# Patient Record
Sex: MALE | Race: WHITE | URBAN - METROPOLITAN AREA
[De-identification: names, ages, dates, MRNs, and addresses within clinical notes are randomized per-mention and may not be internally consistent; named-entity substitution may affect disease eponyms.]

---

## 2018-02-13 ENCOUNTER — EMERGENCY (EMERGENCY)
Facility: HOSPITAL | Age: 63
LOS: 0 days | Discharge: HOME | End: 2018-02-13
Attending: EMERGENCY MEDICINE | Admitting: EMERGENCY MEDICINE
Payer: SELF-PAY

## 2018-02-13 VITALS
OXYGEN SATURATION: 100 % | HEIGHT: 68 IN | RESPIRATION RATE: 18 BRPM | SYSTOLIC BLOOD PRESSURE: 180 MMHG | WEIGHT: 171.08 LBS | HEART RATE: 77 BPM | DIASTOLIC BLOOD PRESSURE: 74 MMHG | TEMPERATURE: 98 F

## 2018-02-13 DIAGNOSIS — E11.9 TYPE 2 DIABETES MELLITUS WITHOUT COMPLICATIONS: ICD-10-CM

## 2018-02-13 DIAGNOSIS — R51 HEADACHE: ICD-10-CM

## 2018-02-13 PROCEDURE — 99285 EMERGENCY DEPT VISIT HI MDM: CPT

## 2018-02-13 PROCEDURE — 99053 MED SERV 10PM-8AM 24 HR FAC: CPT

## 2018-02-13 PROCEDURE — 70450 CT HEAD/BRAIN W/O DYE: CPT | Mod: 26

## 2018-02-13 RX ORDER — ACETAMINOPHEN 500 MG
650 TABLET ORAL ONCE
Qty: 0 | Refills: 0 | Status: COMPLETED | OUTPATIENT
Start: 2018-02-13 | End: 2018-02-13

## 2018-02-13 RX ADMIN — Medication 650 MILLIGRAM(S): at 22:10
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
## 2020-03-25 NOTE — ED ADULT NURSE NOTE - NS ED NURSE RECORD ANOTHER HT AND WT
SUBJECTIVE:  Grant Shoemaker is a 60 year old male with a chief complaint of sore throat.  Onset of symptoms was 1 day(s) ago.    Course of illness: sudden onset.  Severity moderate  Current and Associated symptoms: fever, chills and sore throat  Treatment measures tried include None tried.  Predisposing factors include exposure to strep and exposure to influenza.  Grocery     Past Medical History:   Diagnosis Date     Adenomatous polyp of colon      Hyperlipidemia      Hypertension      Hypothyroidism      ANN (obstructive sleep apnea)      Current Outpatient Medications   Medication Sig Dispense Refill     ASPIRIN ADULT LOW STRENGTH 81 MG EC tablet Take 1 tablet by mouth daily       atenolol (TENORMIN) 50 MG tablet Take 50 mg by mouth daily       atorvastatin (LIPITOR) 10 MG tablet Take 10 mg by mouth daily       calcium carbonate 750 MG CHEW Take 1,500 mg by mouth daily       Cholecalciferol (VITAMIN D3) 50 MCG (2000 UT) CAPS Take 2 tablets by mouth daily       levothyroxine (SYNTHROID/LEVOTHROID) 150 MCG tablet Take 1 tablet by mouth daily       lisinopril (ZESTRIL) 10 MG tablet Take 10 mg by mouth daily       triamterene-HCTZ (MAXZIDE-25) 37.5-25 MG tablet Take 1 tablet by mouth daily       Social History     Tobacco Use     Smoking status: Never Smoker     Smokeless tobacco: Never Used   Substance Use Topics     Alcohol use: Yes     Frequency: Monthly or less       ROS:  10 point ROS negative except as listed above      OBJECTIVE:   /74   Pulse 78   Temp 97.8  F (36.6  C) (Tympanic)   Resp 20   Wt 100.7 kg (222 lb)   SpO2 98%   BMI 34.77 kg/m    GENERAL APPEARANCE: healthy, alert and no distress  EYES: EOMI,  PERRL, conjunctiva clear  HENT: ear canals and TM's normal.  Nose normal.  Pharynx erythematous with some exudate noted.  NECK: supple, non-tender to palpation, no adenopathy noted  RESP: lungs clear to auscultation - no rales, rhonchi or wheezes  CV: regular rates and rhythm,  normal S1 S2, no murmur noted  ABDOMEN:  soft, nontender, no HSM or masses and bowel sounds normal  SKIN: no suspicious lesions or rashes    Results for orders placed or performed in visit on 03/25/20   Streptococcus A Rapid Scr w Reflx to PCR     Status: None    Specimen: Throat   Result Value Ref Range    Strep Specimen Description Throat     Streptococcus Group A Rapid Screen Negative NEG^Negative       ASSESSMENT:  (R68.89) Suspected Covid-19 Virus Infection  (primary encounter diagnosis)  (J02.9) Sore throat  Plan: Streptococcus A Rapid Scr w Reflx to PCR, Group        A Streptococcus PCR Throat Swab          Fluids, rest    7 day quarantine.  No return without 72 hours symptoms free     Yes

## 2022-09-30 ENCOUNTER — INPATIENT (INPATIENT)
Facility: HOSPITAL | Age: 67
LOS: 2 days | Discharge: HOME | DRG: 419 | End: 2022-10-03
Attending: SURGERY | Admitting: SURGERY
Payer: MEDICARE

## 2022-09-30 VITALS
WEIGHT: 149.91 LBS | SYSTOLIC BLOOD PRESSURE: 194 MMHG | HEART RATE: 63 BPM | TEMPERATURE: 98 F | RESPIRATION RATE: 19 BRPM | OXYGEN SATURATION: 98 % | HEIGHT: 68 IN | DIASTOLIC BLOOD PRESSURE: 76 MMHG

## 2022-09-30 DIAGNOSIS — Z95.1 PRESENCE OF AORTOCORONARY BYPASS GRAFT: Chronic | ICD-10-CM

## 2022-09-30 DIAGNOSIS — R10.9 UNSPECIFIED ABDOMINAL PAIN: ICD-10-CM

## 2022-09-30 PROBLEM — E11.9 TYPE 2 DIABETES MELLITUS WITHOUT COMPLICATIONS: Chronic | Status: ACTIVE | Noted: 2018-02-14

## 2022-09-30 LAB
ALBUMIN SERPL ELPH-MCNC: 4.4 G/DL — SIGNIFICANT CHANGE UP (ref 3.3–5)
ALP SERPL-CCNC: 70 U/L — SIGNIFICANT CHANGE UP (ref 40–120)
ALT FLD-CCNC: 25 U/L — SIGNIFICANT CHANGE UP (ref 12–78)
ANION GAP SERPL CALC-SCNC: 6 MMOL/L — SIGNIFICANT CHANGE UP (ref 5–17)
AST SERPL-CCNC: 24 U/L — SIGNIFICANT CHANGE UP (ref 15–37)
BASOPHILS # BLD AUTO: 0 K/UL — SIGNIFICANT CHANGE UP (ref 0–0.2)
BASOPHILS NFR BLD AUTO: 0 % — SIGNIFICANT CHANGE UP (ref 0–2)
BILIRUB SERPL-MCNC: 1.7 MG/DL — HIGH (ref 0.2–1.2)
BUN SERPL-MCNC: 19 MG/DL — SIGNIFICANT CHANGE UP (ref 7–23)
CALCIUM SERPL-MCNC: 9.1 MG/DL — SIGNIFICANT CHANGE UP (ref 8.5–10.1)
CHLORIDE SERPL-SCNC: 103 MMOL/L — SIGNIFICANT CHANGE UP (ref 96–108)
CO2 SERPL-SCNC: 27 MMOL/L — SIGNIFICANT CHANGE UP (ref 22–31)
CREAT SERPL-MCNC: 0.92 MG/DL — SIGNIFICANT CHANGE UP (ref 0.5–1.3)
EGFR: 91 ML/MIN/1.73M2 — SIGNIFICANT CHANGE UP
EOSINOPHIL # BLD AUTO: 0 K/UL — SIGNIFICANT CHANGE UP (ref 0–0.5)
EOSINOPHIL NFR BLD AUTO: 0 % — SIGNIFICANT CHANGE UP (ref 0–6)
FLUAV AG NPH QL: SIGNIFICANT CHANGE UP
FLUBV AG NPH QL: SIGNIFICANT CHANGE UP
GLUCOSE SERPL-MCNC: 154 MG/DL — HIGH (ref 70–99)
HCT VFR BLD CALC: 39.4 % — SIGNIFICANT CHANGE UP (ref 39–50)
HGB BLD-MCNC: 12.8 G/DL — LOW (ref 13–17)
LACTATE SERPL-SCNC: 1.5 MMOL/L — SIGNIFICANT CHANGE UP (ref 0.7–2)
LIDOCAIN IGE QN: 118 U/L — SIGNIFICANT CHANGE UP (ref 73–393)
LYMPHOCYTES # BLD AUTO: 0.39 K/UL — LOW (ref 1–3.3)
LYMPHOCYTES # BLD AUTO: 3 % — LOW (ref 13–44)
MAGNESIUM SERPL-MCNC: 1.5 MG/DL — LOW (ref 1.6–2.6)
MCHC RBC-ENTMCNC: 28.9 PG — SIGNIFICANT CHANGE UP (ref 27–34)
MCHC RBC-ENTMCNC: 32.5 GM/DL — SIGNIFICANT CHANGE UP (ref 32–36)
MCV RBC AUTO: 88.9 FL — SIGNIFICANT CHANGE UP (ref 80–100)
MONOCYTES # BLD AUTO: 0.65 K/UL — SIGNIFICANT CHANGE UP (ref 0–0.9)
MONOCYTES NFR BLD AUTO: 5 % — SIGNIFICANT CHANGE UP (ref 2–14)
NEUTROPHILS # BLD AUTO: 12.03 K/UL — HIGH (ref 1.8–7.4)
NEUTROPHILS NFR BLD AUTO: 92 % — HIGH (ref 43–77)
NRBC # BLD: SIGNIFICANT CHANGE UP /100 WBCS (ref 0–0)
PLATELET # BLD AUTO: 238 K/UL — SIGNIFICANT CHANGE UP (ref 150–400)
POTASSIUM SERPL-MCNC: 4.4 MMOL/L — SIGNIFICANT CHANGE UP (ref 3.5–5.3)
POTASSIUM SERPL-SCNC: 4.4 MMOL/L — SIGNIFICANT CHANGE UP (ref 3.5–5.3)
PROT SERPL-MCNC: 7.8 GM/DL — SIGNIFICANT CHANGE UP (ref 6–8.3)
RBC # BLD: 4.43 M/UL — SIGNIFICANT CHANGE UP (ref 4.2–5.8)
RBC # FLD: 13.1 % — SIGNIFICANT CHANGE UP (ref 10.3–14.5)
RSV RNA NPH QL NAA+NON-PROBE: SIGNIFICANT CHANGE UP
SARS-COV-2 RNA SPEC QL NAA+PROBE: SIGNIFICANT CHANGE UP
SODIUM SERPL-SCNC: 136 MMOL/L — SIGNIFICANT CHANGE UP (ref 135–145)
TROPONIN I, HIGH SENSITIVITY RESULT: 8.36 NG/L — SIGNIFICANT CHANGE UP
WBC # BLD: 13.08 K/UL — HIGH (ref 3.8–10.5)
WBC # FLD AUTO: 13.08 K/UL — HIGH (ref 3.8–10.5)

## 2022-09-30 PROCEDURE — 99223 1ST HOSP IP/OBS HIGH 75: CPT

## 2022-09-30 PROCEDURE — 99285 EMERGENCY DEPT VISIT HI MDM: CPT | Mod: FS

## 2022-09-30 PROCEDURE — 83036 HEMOGLOBIN GLYCOSYLATED A1C: CPT

## 2022-09-30 PROCEDURE — 86900 BLOOD TYPING SEROLOGIC ABO: CPT

## 2022-09-30 PROCEDURE — 74183 MRI ABD W/O CNTR FLWD CNTR: CPT

## 2022-09-30 PROCEDURE — 76705 ECHO EXAM OF ABDOMEN: CPT | Mod: 26

## 2022-09-30 PROCEDURE — A9579: CPT

## 2022-09-30 PROCEDURE — 80053 COMPREHEN METABOLIC PANEL: CPT

## 2022-09-30 PROCEDURE — 83735 ASSAY OF MAGNESIUM: CPT

## 2022-09-30 PROCEDURE — 86901 BLOOD TYPING SEROLOGIC RH(D): CPT

## 2022-09-30 PROCEDURE — 47562 LAPAROSCOPIC CHOLECYSTECTOMY: CPT | Mod: AS

## 2022-09-30 PROCEDURE — 86803 HEPATITIS C AB TEST: CPT

## 2022-09-30 PROCEDURE — 88304 TISSUE EXAM BY PATHOLOGIST: CPT

## 2022-09-30 PROCEDURE — C9399: CPT

## 2022-09-30 PROCEDURE — 36415 COLL VENOUS BLD VENIPUNCTURE: CPT

## 2022-09-30 PROCEDURE — 86850 RBC ANTIBODY SCREEN: CPT

## 2022-09-30 PROCEDURE — 74177 CT ABD & PELVIS W/CONTRAST: CPT | Mod: 26,MA

## 2022-09-30 PROCEDURE — 82962 GLUCOSE BLOOD TEST: CPT

## 2022-09-30 PROCEDURE — 90662 IIV NO PRSV INCREASED AG IM: CPT

## 2022-09-30 PROCEDURE — 12345: CPT | Mod: NC

## 2022-09-30 PROCEDURE — 85025 COMPLETE CBC W/AUTO DIFF WBC: CPT

## 2022-09-30 PROCEDURE — 80076 HEPATIC FUNCTION PANEL: CPT

## 2022-09-30 PROCEDURE — 80048 BASIC METABOLIC PNL TOTAL CA: CPT

## 2022-09-30 RX ORDER — ATORVASTATIN CALCIUM 80 MG/1
1 TABLET, FILM COATED ORAL
Qty: 0 | Refills: 0 | DISCHARGE

## 2022-09-30 RX ORDER — HYDROMORPHONE HYDROCHLORIDE 2 MG/ML
0.5 INJECTION INTRAMUSCULAR; INTRAVENOUS; SUBCUTANEOUS ONCE
Refills: 0 | Status: DISCONTINUED | OUTPATIENT
Start: 2022-09-30 | End: 2022-09-30

## 2022-09-30 RX ORDER — ONDANSETRON 8 MG/1
4 TABLET, FILM COATED ORAL ONCE
Refills: 0 | Status: COMPLETED | OUTPATIENT
Start: 2022-09-30 | End: 2022-09-30

## 2022-09-30 RX ORDER — HYDROMORPHONE HYDROCHLORIDE 2 MG/ML
1 INJECTION INTRAMUSCULAR; INTRAVENOUS; SUBCUTANEOUS EVERY 4 HOURS
Refills: 0 | Status: DISCONTINUED | OUTPATIENT
Start: 2022-09-30 | End: 2022-10-03

## 2022-09-30 RX ORDER — MAGNESIUM SULFATE 500 MG/ML
2 VIAL (ML) INJECTION ONCE
Refills: 0 | Status: COMPLETED | OUTPATIENT
Start: 2022-09-30 | End: 2022-09-30

## 2022-09-30 RX ORDER — INSULIN LISPRO 100/ML
VIAL (ML) SUBCUTANEOUS EVERY 6 HOURS
Refills: 0 | Status: DISCONTINUED | OUTPATIENT
Start: 2022-09-30 | End: 2022-10-03

## 2022-09-30 RX ORDER — INSULIN LISPRO 100/ML
VIAL (ML) SUBCUTANEOUS
Refills: 0 | Status: DISCONTINUED | OUTPATIENT
Start: 2022-09-30 | End: 2022-09-30

## 2022-09-30 RX ORDER — MORPHINE SULFATE 50 MG/1
2 CAPSULE, EXTENDED RELEASE ORAL ONCE
Refills: 0 | Status: DISCONTINUED | OUTPATIENT
Start: 2022-09-30 | End: 2022-09-30

## 2022-09-30 RX ORDER — SODIUM CHLORIDE 9 MG/ML
1000 INJECTION INTRAMUSCULAR; INTRAVENOUS; SUBCUTANEOUS ONCE
Refills: 0 | Status: COMPLETED | OUTPATIENT
Start: 2022-09-30 | End: 2022-09-30

## 2022-09-30 RX ORDER — SODIUM CHLORIDE 9 MG/ML
1000 INJECTION, SOLUTION INTRAVENOUS
Refills: 0 | Status: DISCONTINUED | OUTPATIENT
Start: 2022-09-30 | End: 2022-10-03

## 2022-09-30 RX ORDER — DEXTROSE 50 % IN WATER 50 %
25 SYRINGE (ML) INTRAVENOUS ONCE
Refills: 0 | Status: DISCONTINUED | OUTPATIENT
Start: 2022-09-30 | End: 2022-10-03

## 2022-09-30 RX ORDER — HEPARIN SODIUM 5000 [USP'U]/ML
5000 INJECTION INTRAVENOUS; SUBCUTANEOUS EVERY 12 HOURS
Refills: 0 | Status: DISCONTINUED | OUTPATIENT
Start: 2022-09-30 | End: 2022-10-03

## 2022-09-30 RX ORDER — PIPERACILLIN AND TAZOBACTAM 4; .5 G/20ML; G/20ML
3.38 INJECTION, POWDER, LYOPHILIZED, FOR SOLUTION INTRAVENOUS EVERY 8 HOURS
Refills: 0 | Status: DISCONTINUED | OUTPATIENT
Start: 2022-10-01 | End: 2022-10-03

## 2022-09-30 RX ORDER — DEXTROSE 50 % IN WATER 50 %
12.5 SYRINGE (ML) INTRAVENOUS ONCE
Refills: 0 | Status: DISCONTINUED | OUTPATIENT
Start: 2022-09-30 | End: 2022-10-03

## 2022-09-30 RX ORDER — SODIUM CHLORIDE 9 MG/ML
1000 INJECTION INTRAMUSCULAR; INTRAVENOUS; SUBCUTANEOUS
Refills: 0 | Status: DISCONTINUED | OUTPATIENT
Start: 2022-09-30 | End: 2022-10-03

## 2022-09-30 RX ORDER — SITAGLIPTIN 50 MG/1
1 TABLET, FILM COATED ORAL
Qty: 0 | Refills: 0 | DISCHARGE

## 2022-09-30 RX ORDER — ENOXAPARIN SODIUM 100 MG/ML
40 INJECTION SUBCUTANEOUS EVERY 12 HOURS
Refills: 0 | Status: DISCONTINUED | OUTPATIENT
Start: 2022-09-30 | End: 2022-09-30

## 2022-09-30 RX ORDER — PIPERACILLIN AND TAZOBACTAM 4; .5 G/20ML; G/20ML
3.38 INJECTION, POWDER, LYOPHILIZED, FOR SOLUTION INTRAVENOUS ONCE
Refills: 0 | Status: COMPLETED | OUTPATIENT
Start: 2022-09-30 | End: 2022-10-01

## 2022-09-30 RX ORDER — ASPIRIN/CALCIUM CARB/MAGNESIUM 324 MG
1 TABLET ORAL
Qty: 0 | Refills: 0 | DISCHARGE

## 2022-09-30 RX ORDER — HYDROMORPHONE HYDROCHLORIDE 2 MG/ML
0.5 INJECTION INTRAMUSCULAR; INTRAVENOUS; SUBCUTANEOUS
Refills: 0 | Status: DISCONTINUED | OUTPATIENT
Start: 2022-09-30 | End: 2022-10-03

## 2022-09-30 RX ORDER — PIPERACILLIN AND TAZOBACTAM 4; .5 G/20ML; G/20ML
3.38 INJECTION, POWDER, LYOPHILIZED, FOR SOLUTION INTRAVENOUS ONCE
Refills: 0 | Status: COMPLETED | OUTPATIENT
Start: 2022-09-30 | End: 2022-09-30

## 2022-09-30 RX ORDER — LOSARTAN POTASSIUM 100 MG/1
1 TABLET, FILM COATED ORAL
Qty: 0 | Refills: 0 | DISCHARGE

## 2022-09-30 RX ORDER — INFLUENZA VIRUS VACCINE 15; 15; 15; 15 UG/.5ML; UG/.5ML; UG/.5ML; UG/.5ML
0.7 SUSPENSION INTRAMUSCULAR ONCE
Refills: 0 | Status: COMPLETED | OUTPATIENT
Start: 2022-09-30 | End: 2022-10-03

## 2022-09-30 RX ORDER — ONDANSETRON 8 MG/1
4 TABLET, FILM COATED ORAL ONCE
Refills: 0 | Status: DISCONTINUED | OUTPATIENT
Start: 2022-09-30 | End: 2022-10-03

## 2022-09-30 RX ORDER — METFORMIN HYDROCHLORIDE 850 MG/1
1 TABLET ORAL
Qty: 0 | Refills: 0 | DISCHARGE

## 2022-09-30 RX ORDER — METOCLOPRAMIDE HCL 10 MG
10 TABLET ORAL ONCE
Refills: 0 | Status: COMPLETED | OUTPATIENT
Start: 2022-09-30 | End: 2022-09-30

## 2022-09-30 RX ORDER — GLUCAGON INJECTION, SOLUTION 0.5 MG/.1ML
1 INJECTION, SOLUTION SUBCUTANEOUS ONCE
Refills: 0 | Status: DISCONTINUED | OUTPATIENT
Start: 2022-09-30 | End: 2022-10-03

## 2022-09-30 RX ORDER — LOSARTAN POTASSIUM 100 MG/1
50 TABLET, FILM COATED ORAL DAILY
Refills: 0 | Status: DISCONTINUED | OUTPATIENT
Start: 2022-09-30 | End: 2022-10-03

## 2022-09-30 RX ORDER — METFORMIN HYDROCHLORIDE 850 MG/1
2 TABLET ORAL
Qty: 0 | Refills: 0 | DISCHARGE

## 2022-09-30 RX ORDER — MAGNESIUM SULFATE 500 MG/ML
1 VIAL (ML) INJECTION ONCE
Refills: 0 | Status: COMPLETED | OUTPATIENT
Start: 2022-09-30 | End: 2022-09-30

## 2022-09-30 RX ORDER — DEXTROSE 50 % IN WATER 50 %
15 SYRINGE (ML) INTRAVENOUS ONCE
Refills: 0 | Status: DISCONTINUED | OUTPATIENT
Start: 2022-09-30 | End: 2022-10-03

## 2022-09-30 RX ADMIN — MORPHINE SULFATE 2 MILLIGRAM(S): 50 CAPSULE, EXTENDED RELEASE ORAL at 15:13

## 2022-09-30 RX ADMIN — SODIUM CHLORIDE 1000 MILLILITER(S): 9 INJECTION INTRAMUSCULAR; INTRAVENOUS; SUBCUTANEOUS at 13:31

## 2022-09-30 RX ADMIN — HEPARIN SODIUM 5000 UNIT(S): 5000 INJECTION INTRAVENOUS; SUBCUTANEOUS at 22:57

## 2022-09-30 RX ADMIN — HYDROMORPHONE HYDROCHLORIDE 0.5 MILLIGRAM(S): 2 INJECTION INTRAMUSCULAR; INTRAVENOUS; SUBCUTANEOUS at 17:04

## 2022-09-30 RX ADMIN — PIPERACILLIN AND TAZOBACTAM 200 GRAM(S): 4; .5 INJECTION, POWDER, LYOPHILIZED, FOR SOLUTION INTRAVENOUS at 17:04

## 2022-09-30 RX ADMIN — Medication 25 GRAM(S): at 23:00

## 2022-09-30 RX ADMIN — Medication 100 GRAM(S): at 15:10

## 2022-09-30 RX ADMIN — SODIUM CHLORIDE 125 MILLILITER(S): 9 INJECTION INTRAMUSCULAR; INTRAVENOUS; SUBCUTANEOUS at 22:58

## 2022-09-30 RX ADMIN — MORPHINE SULFATE 2 MILLIGRAM(S): 50 CAPSULE, EXTENDED RELEASE ORAL at 13:30

## 2022-09-30 RX ADMIN — SODIUM CHLORIDE 1000 MILLILITER(S): 9 INJECTION INTRAMUSCULAR; INTRAVENOUS; SUBCUTANEOUS at 17:04

## 2022-09-30 RX ADMIN — Medication 10 MILLIGRAM(S): at 17:04

## 2022-09-30 RX ADMIN — ONDANSETRON 4 MILLIGRAM(S): 8 TABLET, FILM COATED ORAL at 13:30

## 2022-09-30 NOTE — ED STATDOCS - OBJECTIVE STATEMENT
66 y/o male with PMHx of DM presents to the ED c/o abdominal pain, vomiting since this morning. No diarrhea. Unable to tolerate PO. No fevers, chills.

## 2022-09-30 NOTE — PHARMACOTHERAPY INTERVENTION NOTE - COMMENTS
Medication history complete, reviewed medications with patient provided and confirmed with doctor first med hx.

## 2022-09-30 NOTE — H&P ADULT - ASSESSMENT
A/P:  Admit to Surgery Dr Lubin  Dx: Acute Appendicitis  NPO  IVF\Replace MG+  Zosyn  Pain control  Insulin/accuchecks  Incentive Spirometry  Zofran PRN  F/U Med consult   F/U Cardio Consult   AM Labs  Plan for OR /Lap Kathleen Tomorrow  Above as discussed with Dr Lubin

## 2022-09-30 NOTE — ED ADULT TRIAGE NOTE - CHIEF COMPLAINT QUOTE
Pt presents to ER c/o abd pain and vomiting. Onset of symptoms began at 0600 this morning. Pt reports abd pain is in the center of his abdomen and he is unable to have a bm

## 2022-09-30 NOTE — PATIENT PROFILE ADULT - FALL HARM RISK - HARM RISK INTERVENTIONS

## 2022-09-30 NOTE — ED STATDOCS - CARE PLAN
1 Principal Discharge DX:	Abdominal pain  Secondary Diagnosis:	Cholelithiasis  Secondary Diagnosis:	Nausea & vomiting

## 2022-09-30 NOTE — PROGRESS NOTE ADULT - SUBJECTIVE AND OBJECTIVE BOX
· HPI Objective Statement: 66 y/o male with PMHx of DM presents to the ED c/o abdominal pain, vomiting since this morning. No diarrhea. Unable to tolerate PO. No fevers, chills. Gallstones on sono and CT with no other secondary signs of cholecystitis.     PE    VSS afeb    Abd- soft, tender RUQ, no rebound, no HSM

## 2022-09-30 NOTE — ED STATDOCS - NSICDXPASTMEDICALHX_GEN_ALL_CORE_FT
Attempted to contact patient but there was no response, left a voicemail advising patient to please return my phone call here at the clinic  Will make patient aware when he gives me a call back   PAST MEDICAL HISTORY:  DM (diabetes mellitus)

## 2022-09-30 NOTE — H&P ADULT - NSHPPHYSICALEXAM_GEN_ALL_CORE
Vital Signs Last 24 Hrs  T(C): 36.8 (30 Sep 2022 12:30), Max: 36.8 (30 Sep 2022 12:30)  T(F): 98.2 (30 Sep 2022 12:30), Max: 98.2 (30 Sep 2022 12:30)  HR: 63 (30 Sep 2022 12:30) (63 - 63)  BP: 194/76 (30 Sep 2022 12:30) (194/76 - 194/76)  BP(mean): 110 (30 Sep 2022 12:30) (110 - 110)  RR: 19 (30 Sep 2022 12:30) (19 - 19)  SpO2: 98% (30 Sep 2022 12:30) (98% - 98%)    Parameters below as of 30 Sep 2022 12:30  Patient On (Oxygen Delivery Method): room air

## 2022-09-30 NOTE — ED STATDOCS - NS ED ATTENDING STATEMENT MOD
This was a shared visit with the SHERICE. I reviewed and verified the documentation and independently performed the documented:

## 2022-09-30 NOTE — CONSULT NOTE ADULT - SUBJECTIVE AND OBJECTIVE BOX
HPI:  67M w/PMH CABG 2019, T2DM, visiting his father from [place], presents w/ periumbilical severe pain that started early this AM, constant, no e/a factors, no similar prior, associated w/ multiple episodes of vomiting. He endorses eating pizza for dinner last night.  Denies any fever/chills, cp, sob, dysuria.  We Have been consulted to manage pt's acute and chronic medical conditions and optimize him for planned surgery.     In ED, Mg 1.5, wbc 13, given IV zosyn, dilaudid, US abdomen: Distended gallbladder containing stones and sludge. Wall thickness is normal. If there is persistent clinical concern for acute cholecystitis recommend HIDA scan. CT a/p Multiple suspected jejunal polyps. Is there history of familial polyposis syndrome? Recommend CT confirmation with enterography and GI consultation. Cholelithiasis.    PAST MEDICAL & SURGICAL HISTORY:  DM (diabetes mellitus)  HTN (hypertension)  HLD (hyperlipidemia)  CAD (coronary artery disease)  S/P coronary artery bypass graft x 4      Review of Systems:   CONSTITUTIONAL: No fever.  EYES: No eye pain or discharge.  ENMT:  No sinus or throat pain  NECK: No pain or stiffness  RESPIRATORY: No cough, wheezing, chills or hemoptysis; No shortness of breath  CARDIOVASCULAR: No chest pain, palpitations, dizziness, or leg swelling  GASTROINTESTINAL: see hpi   GENITOURINARY: No dysuria or incontinence  NEUROLOGICAL: No headaches, memory loss, loss of strength, numbness, or tremors  SKIN: No rashes.  MUSCULOSKELETAL: No joint pain or swelling; No muscle, back, or extremity pain  PSYCHIATRIC: No depression, anxiety, mood swings, or difficulty sleeping      Allergies  No Known Allergies    Social History:   denies smoking, occ etoh  ind ADLs    FAMILY HISTORY:  no FHx of GB disease.     Home Medications:  Aspir 81 oral delayed release tablet: 1 tab(s) orally once a day (in the morning) (30 Sep 2022 17:28)  atorvastatin 80 mg oral tablet: 1 tab(s) orally once a day (in the evening) (30 Sep 2022 17:28)  Januvia 50 mg oral tablet: 1 tab(s) orally once a day (in the morning) (30 Sep 2022 17:28)  losartan 50 mg oral tablet: 1 tab(s) orally once a day (in the morning) (30 Sep 2022 17:28)  metFORMIN 1000 mg oral tablet: 2 tab(s) orally 2 times a day (30 Sep 2022 17:28)      MEDICATIONS  (STANDING):  dextrose 5%. 1000 milliLiter(s) (50 mL/Hr) IV Continuous <Continuous>  dextrose 5%. 1000 milliLiter(s) (100 mL/Hr) IV Continuous <Continuous>  dextrose 50% Injectable 25 Gram(s) IV Push once  dextrose 50% Injectable 12.5 Gram(s) IV Push once  dextrose 50% Injectable 25 Gram(s) IV Push once  glucagon  Injectable 1 milliGRAM(s) IntraMuscular once  heparin   Injectable 5000 Unit(s) SubCutaneous every 12 hours  insulin lispro (ADMELOG) corrective regimen sliding scale   SubCutaneous three times a day before meals  losartan 50 milliGRAM(s) Oral daily  magnesium sulfate  IVPB 2 Gram(s) IV Intermittent once  ondansetron Injectable 4 milliGRAM(s) IV Push once  piperacillin/tazobactam IVPB.- 3.375 Gram(s) IV Intermittent once  sodium chloride 0.9%. 1000 milliLiter(s) (125 mL/Hr) IV Continuous <Continuous>    MEDICATIONS  (PRN):  dextrose Oral Gel 15 Gram(s) Oral once PRN Blood Glucose LESS THAN 70 milliGRAM(s)/deciliter  HYDROmorphone  Injectable 0.5 milliGRAM(s) IV Push every 3 hours PRN Moderate Pain (4 - 6)  HYDROmorphone  Injectable 1 milliGRAM(s) IV Push every 4 hours PRN Severe Pain (7 - 10)      PHYSICAL EXAM:  Vital Signs Last 24 Hrs  T(C): 36.9 (30 Sep 2022 21:24), Max: 36.9 (30 Sep 2022 21:24)  T(F): 98.4 (30 Sep 2022 21:24), Max: 98.4 (30 Sep 2022 21:24)  HR: 62 (30 Sep 2022 21:24) (62 - 63)  BP: 143/75 (30 Sep 2022 21:24) (143/75 - 194/76)  BP(mean): 110 (30 Sep 2022 12:30) (110 - 110)  RR: 18 (30 Sep 2022 21:24) (18 - 19)  SpO2: 99% (30 Sep 2022 21:24) (98% - 99%)    Parameters below as of 30 Sep 2022 21:24  Patient On (Oxygen Delivery Method): room air  GENERAL: NAD,   HEAD:  Atraumatic, Normocephalic  EYES: EOMI, PERRLA, conjunctiva and sclera clear  ENT: normal hearing, no nasal discharge, throat clear, dentition normal  NECK: Supple, No JVD, no LAD, no thyromegaly   CHEST/LUNG: Clear to auscultation bilaterally; No wheeze, respirations unlabored  HEART: Regular rate and rhythm; No murmurs, rubs, or gallops  ABDOMEN: Soft, ++RUQ TTP,  Nondistended; Bowel sounds present, no HSM  EXTREMITIES:  2+ Peripheral Pulses, No clubbing, cyanosis, or edema  PSYCH: AAOx3, normal behavior  NEUROLOGY: non-focal, sensory and cn 2-12 intact, speech/language intact  SKIN: No visible rashes or lesions    LABS:                        12.8   13.08 )-----------( 238      ( 30 Sep 2022 13:21 )             39.4     09-30    136  |  103  |  19  ----------------------------<  154<H>  4.4   |  27  |  0.92    Ca    9.1      30 Sep 2022 13:21  Mg     1.5     09-30    TPro  7.8  /  Alb  4.4  /  TBili  1.7<H>  /  DBili  x   /  AST  24  /  ALT  25  /  AlkPhos  70  09-30              RADIOLOGY & ADDITIONAL TESTS:    Imaging Personally Reviewed:  CT a/p and US as above     EKG Personally Reviewed:  sinus bradycardia HR 52    Consultant(s) Notes Reviewed:    surgery h/p    Care Discussed with Consultants/Other Providers:

## 2022-09-30 NOTE — ED STATDOCS - PROGRESS NOTE DETAILS
68 y/o Male with NIDDM on januvia, metformin presents to ED c/o acute onset of upper abdominal pain with multiple episodes of vomiting.  Neg diarrhea, Neg sick contacts, Neg fevers.  Pt appears uncomfortable.  Pink conjunctiva, neg scleral icterus.  CTA B. Abd: flat, (+) Tenderness to palp., ( worst in epigastrum).  Will f/u Labs, CT.  Re-eval s/p meds.  Nevaeh Olivera PA-C On re-eval, pt had Epigastric / RUQ pain.  WBc 13,000.  Lipase WNL.  CT with cholelithiasis and ? polyps in small intestines.  US was ordered that shows multiple stones and sludge in gallbladder.  Wall thickness normal.  If still clinically suspicious, HIDA recommended.  Discussed case with Dr. Lubin who asked for Surgical PA to be called for admission to his service.  Nevaeh Olivera PA-C

## 2022-09-30 NOTE — ED STATDOCS - ATTENDING APP SHARED VISIT CONTRIBUTION OF CARE
I, Bianka Guerrero DO,  performed the initial face to face bedside interview with this patient regarding history of present illness, review of symptoms and relevant past medical, social and family history.  I completed an independent physical examination.  I was the initial provider who evaluated this patient.   I personally saw the patient and performed a substantive portion of the visit including all aspects of the medical decision making.  I have signed out the follow up of any pending tests (i.e. labs, radiological studies) to the ACP.  I have communicated the patient’s plan of care and disposition with the ACP.  The history, relevant review of systems, past medical and surgical history, medical decision making, and physical examination was documented by the scribe in my presence and I attest to the accuracy of the documentation.

## 2022-10-01 LAB
A1C WITH ESTIMATED AVERAGE GLUCOSE RESULT: 6.1 % — HIGH (ref 4–5.6)
ALBUMIN SERPL ELPH-MCNC: 3.2 G/DL — LOW (ref 3.3–5)
ALP SERPL-CCNC: 93 U/L — SIGNIFICANT CHANGE UP (ref 40–120)
ALT FLD-CCNC: 660 U/L — HIGH (ref 12–78)
ANION GAP SERPL CALC-SCNC: 4 MMOL/L — LOW (ref 5–17)
AST SERPL-CCNC: 722 U/L — HIGH (ref 15–37)
BASOPHILS # BLD AUTO: 0.01 K/UL — SIGNIFICANT CHANGE UP (ref 0–0.2)
BASOPHILS NFR BLD AUTO: 0.2 % — SIGNIFICANT CHANGE UP (ref 0–2)
BILIRUB SERPL-MCNC: 2.9 MG/DL — HIGH (ref 0.2–1.2)
BUN SERPL-MCNC: 12 MG/DL — SIGNIFICANT CHANGE UP (ref 7–23)
CALCIUM SERPL-MCNC: 8.5 MG/DL — SIGNIFICANT CHANGE UP (ref 8.5–10.1)
CHLORIDE SERPL-SCNC: 106 MMOL/L — SIGNIFICANT CHANGE UP (ref 96–108)
CO2 SERPL-SCNC: 28 MMOL/L — SIGNIFICANT CHANGE UP (ref 22–31)
CREAT SERPL-MCNC: 0.9 MG/DL — SIGNIFICANT CHANGE UP (ref 0.5–1.3)
EGFR: 94 ML/MIN/1.73M2 — SIGNIFICANT CHANGE UP
EOSINOPHIL # BLD AUTO: 0.01 K/UL — SIGNIFICANT CHANGE UP (ref 0–0.5)
EOSINOPHIL NFR BLD AUTO: 0.2 % — SIGNIFICANT CHANGE UP (ref 0–6)
ESTIMATED AVERAGE GLUCOSE: 128 MG/DL — HIGH (ref 68–114)
GLUCOSE SERPL-MCNC: 112 MG/DL — HIGH (ref 70–99)
HCT VFR BLD CALC: 34 % — LOW (ref 39–50)
HCV AB S/CO SERPL IA: 0.07 S/CO — SIGNIFICANT CHANGE UP (ref 0–0.99)
HCV AB SERPL-IMP: SIGNIFICANT CHANGE UP
HGB BLD-MCNC: 11.1 G/DL — LOW (ref 13–17)
IMM GRANULOCYTES NFR BLD AUTO: 0.3 % — SIGNIFICANT CHANGE UP (ref 0–0.9)
LYMPHOCYTES # BLD AUTO: 0.75 K/UL — LOW (ref 1–3.3)
LYMPHOCYTES # BLD AUTO: 11.8 % — LOW (ref 13–44)
MAGNESIUM SERPL-MCNC: 2.3 MG/DL — SIGNIFICANT CHANGE UP (ref 1.6–2.6)
MCHC RBC-ENTMCNC: 29.2 PG — SIGNIFICANT CHANGE UP (ref 27–34)
MCHC RBC-ENTMCNC: 32.6 GM/DL — SIGNIFICANT CHANGE UP (ref 32–36)
MCV RBC AUTO: 89.5 FL — SIGNIFICANT CHANGE UP (ref 80–100)
MONOCYTES # BLD AUTO: 0.73 K/UL — SIGNIFICANT CHANGE UP (ref 0–0.9)
MONOCYTES NFR BLD AUTO: 11.4 % — SIGNIFICANT CHANGE UP (ref 2–14)
NEUTROPHILS # BLD AUTO: 4.86 K/UL — SIGNIFICANT CHANGE UP (ref 1.8–7.4)
NEUTROPHILS NFR BLD AUTO: 76.1 % — SIGNIFICANT CHANGE UP (ref 43–77)
PLATELET # BLD AUTO: 204 K/UL — SIGNIFICANT CHANGE UP (ref 150–400)
POTASSIUM SERPL-MCNC: 3.8 MMOL/L — SIGNIFICANT CHANGE UP (ref 3.5–5.3)
POTASSIUM SERPL-SCNC: 3.8 MMOL/L — SIGNIFICANT CHANGE UP (ref 3.5–5.3)
PROT SERPL-MCNC: 6.3 GM/DL — SIGNIFICANT CHANGE UP (ref 6–8.3)
RBC # BLD: 3.8 M/UL — LOW (ref 4.2–5.8)
RBC # FLD: 13.2 % — SIGNIFICANT CHANGE UP (ref 10.3–14.5)
SODIUM SERPL-SCNC: 138 MMOL/L — SIGNIFICANT CHANGE UP (ref 135–145)
WBC # BLD: 6.38 K/UL — SIGNIFICANT CHANGE UP (ref 3.8–10.5)
WBC # FLD AUTO: 6.38 K/UL — SIGNIFICANT CHANGE UP (ref 3.8–10.5)

## 2022-10-01 PROCEDURE — 99232 SBSQ HOSP IP/OBS MODERATE 35: CPT

## 2022-10-01 PROCEDURE — 74183 MRI ABD W/O CNTR FLWD CNTR: CPT | Mod: 26

## 2022-10-01 RX ADMIN — HEPARIN SODIUM 5000 UNIT(S): 5000 INJECTION INTRAVENOUS; SUBCUTANEOUS at 21:19

## 2022-10-01 RX ADMIN — PIPERACILLIN AND TAZOBACTAM 25 GRAM(S): 4; .5 INJECTION, POWDER, LYOPHILIZED, FOR SOLUTION INTRAVENOUS at 01:45

## 2022-10-01 RX ADMIN — PIPERACILLIN AND TAZOBACTAM 25 GRAM(S): 4; .5 INJECTION, POWDER, LYOPHILIZED, FOR SOLUTION INTRAVENOUS at 10:37

## 2022-10-01 RX ADMIN — PIPERACILLIN AND TAZOBACTAM 25 GRAM(S): 4; .5 INJECTION, POWDER, LYOPHILIZED, FOR SOLUTION INTRAVENOUS at 16:49

## 2022-10-01 RX ADMIN — LOSARTAN POTASSIUM 50 MILLIGRAM(S): 100 TABLET, FILM COATED ORAL at 10:37

## 2022-10-01 RX ADMIN — Medication 1 MILLIGRAM(S): at 16:49

## 2022-10-01 RX ADMIN — HYDROMORPHONE HYDROCHLORIDE 1 MILLIGRAM(S): 2 INJECTION INTRAMUSCULAR; INTRAVENOUS; SUBCUTANEOUS at 18:43

## 2022-10-01 RX ADMIN — HYDROMORPHONE HYDROCHLORIDE 1 MILLIGRAM(S): 2 INJECTION INTRAMUSCULAR; INTRAVENOUS; SUBCUTANEOUS at 17:17

## 2022-10-01 NOTE — PROGRESS NOTE ADULT - SUBJECTIVE AND OBJECTIVE BOX
Patient is a 67y old  Male who presents with a chief complaint of Acute Cholecystitis (30 Sep 2022 22:55)      SUBJECTIVE / OVERNIGHT EVENTS:    MEDICATIONS  (STANDING):  dextrose 5%. 1000 milliLiter(s) (50 mL/Hr) IV Continuous <Continuous>  dextrose 5%. 1000 milliLiter(s) (100 mL/Hr) IV Continuous <Continuous>  dextrose 50% Injectable 25 Gram(s) IV Push once  dextrose 50% Injectable 12.5 Gram(s) IV Push once  dextrose 50% Injectable 25 Gram(s) IV Push once  glucagon  Injectable 1 milliGRAM(s) IntraMuscular once  heparin   Injectable 5000 Unit(s) SubCutaneous every 12 hours  influenza  Vaccine (HIGH DOSE) 0.7 milliLiter(s) IntraMuscular once  insulin lispro (ADMELOG) corrective regimen sliding scale   SubCutaneous every 6 hours  losartan 50 milliGRAM(s) Oral daily  ondansetron Injectable 4 milliGRAM(s) IV Push once  piperacillin/tazobactam IVPB.. 3.375 Gram(s) IV Intermittent every 8 hours  sodium chloride 0.9%. 1000 milliLiter(s) (125 mL/Hr) IV Continuous <Continuous>    MEDICATIONS  (PRN):  dextrose Oral Gel 15 Gram(s) Oral once PRN Blood Glucose LESS THAN 70 milliGRAM(s)/deciliter  HYDROmorphone  Injectable 0.5 milliGRAM(s) IV Push every 3 hours PRN Moderate Pain (4 - 6)  HYDROmorphone  Injectable 1 milliGRAM(s) IV Push every 4 hours PRN Severe Pain (7 - 10)      CAPILLARY BLOOD GLUCOSE      POCT Blood Glucose.: 126 mg/dL (01 Oct 2022 05:22)  POCT Blood Glucose.: 135 mg/dL (30 Sep 2022 23:50)    I&O's Summary      PHYSICAL EXAM:  Vital Signs Last 24 Hrs  T(C): 36.3 (01 Oct 2022 08:54), Max: 37.1 (30 Sep 2022 23:36)  T(F): 97.4 (01 Oct 2022 08:54), Max: 98.8 (30 Sep 2022 23:36)  HR: 62 (01 Oct 2022 08:54) (62 - 67)  BP: 137/48 (01 Oct 2022 08:54) (131/61 - 194/76)  BP(mean): 110 (30 Sep 2022 12:30) (110 - 110)  RR: 17 (01 Oct 2022 08:54) (17 - 19)  SpO2: 100% (01 Oct 2022 08:54) (98% - 100%)    Parameters below as of 01 Oct 2022 08:54  Patient On (Oxygen Delivery Method): room air      GENERAL: NAD, well-developed  HEAD:  Atraumatic, Normocephalic  EYES: EOMI, PERRLA, conjunctiva and sclera clear  NECK: Supple, No JVD, no LAD  CHEST/LUNG: Clear to auscultation bilaterally; No wheeze, resp unlabored  HEART: Regular rate and rhythm; No murmurs, rubs, or gallops  ABDOMEN: Soft, Nontender, Nondistended; Bowel sounds present, no HSM  EXTREMITIES:  2+ Peripheral Pulses, No clubbing, cyanosis, or edema  PSYCH: AAOx3, normal behavior  NEUROLOGY: non-focal, sensory and cn 2-12 intact  SKIN: No visible rashes or lesions    LABS:                        11.1   6.38  )-----------( 204      ( 01 Oct 2022 07:45 )             34.0     10-01    138  |  106  |  12  ----------------------------<  112<H>  3.8   |  28  |  0.90    Ca    8.5      01 Oct 2022 07:45  Mg     1.5     09-30    TPro  6.3  /  Alb  3.2<L>  /  TBili  2.9<H>  /  DBili  x   /  AST  722<H>  /  ALT  660<H>  /  AlkPhos  93  10-01                RADIOLOGY & ADDITIONAL TESTS:    Imaging Personally Reviewed:    Consultant(s) Notes Reviewed:      Care Discussed with Consultants/Other Providers:   Patient is a 67y old  Male who presents with a chief complaint of Acute Cholecystitis (30 Sep 2022 22:55)      SUBJECTIVE / OVERNIGHT EVENTS:  10/1: pt has no new c/o, pain much improved. He's not sure if he had TTE w/ his cardiologist, but states he had cxr. I called his cardio in [place], [phone], s/w on-call cardio, Dr Nicole, who endorsed that there's no record of any recent TTE for pt.  Pt denies any cp, sob.      MEDICATIONS  (STANDING):  dextrose 5%. 1000 milliLiter(s) (50 mL/Hr) IV Continuous <Continuous>  dextrose 5%. 1000 milliLiter(s) (100 mL/Hr) IV Continuous <Continuous>  dextrose 50% Injectable 25 Gram(s) IV Push once  dextrose 50% Injectable 12.5 Gram(s) IV Push once  dextrose 50% Injectable 25 Gram(s) IV Push once  glucagon  Injectable 1 milliGRAM(s) IntraMuscular once  heparin   Injectable 5000 Unit(s) SubCutaneous every 12 hours  influenza  Vaccine (HIGH DOSE) 0.7 milliLiter(s) IntraMuscular once  insulin lispro (ADMELOG) corrective regimen sliding scale   SubCutaneous every 6 hours  losartan 50 milliGRAM(s) Oral daily  ondansetron Injectable 4 milliGRAM(s) IV Push once  piperacillin/tazobactam IVPB.. 3.375 Gram(s) IV Intermittent every 8 hours  sodium chloride 0.9%. 1000 milliLiter(s) (125 mL/Hr) IV Continuous <Continuous>    MEDICATIONS  (PRN):  dextrose Oral Gel 15 Gram(s) Oral once PRN Blood Glucose LESS THAN 70 milliGRAM(s)/deciliter  HYDROmorphone  Injectable 0.5 milliGRAM(s) IV Push every 3 hours PRN Moderate Pain (4 - 6)  HYDROmorphone  Injectable 1 milliGRAM(s) IV Push every 4 hours PRN Severe Pain (7 - 10)      CAPILLARY BLOOD GLUCOSE  POCT Blood Glucose.: 126 mg/dL (01 Oct 2022 05:22)  POCT Blood Glucose.: 135 mg/dL (30 Sep 2022 23:50)    I&O's Summary      PHYSICAL EXAM:  Vital Signs Last 24 Hrs  T(C): 36.3 (01 Oct 2022 08:54), Max: 37.1 (30 Sep 2022 23:36)  T(F): 97.4 (01 Oct 2022 08:54), Max: 98.8 (30 Sep 2022 23:36)  HR: 62 (01 Oct 2022 08:54) (62 - 67)  BP: 137/48 (01 Oct 2022 08:54) (131/61 - 194/76)  BP(mean): 110 (30 Sep 2022 12:30) (110 - 110)  RR: 17 (01 Oct 2022 08:54) (17 - 19)  SpO2: 100% (01 Oct 2022 08:54) (98% - 100%)    Parameters below as of 01 Oct 2022 08:54  Patient On (Oxygen Delivery Method): room air      GENERAL: NAD,   HEAD:  Atraumatic, Normocephalic  EYES: EOMI, PERRLA, conjunctiva and sclera clear  NECK: Supple, No JVD, no LAD  CHEST/LUNG: Clear to auscultation bilaterally; No wheeze, resp unlabored  HEART: Regular rate and rhythm; No murmurs, rubs, or gallops  ABDOMEN: Soft, Nontender, Nondistended; Bowel sounds present, no HSM  EXTREMITIES:  2+ Peripheral Pulses, No clubbing, cyanosis, or edema  PSYCH: AAOx3, normal behavior  NEUROLOGY: non-focal, sensory and cn 2-12 intact  SKIN: No visible rashes or lesions    LABS:                        11.1   6.38  )-----------( 204      ( 01 Oct 2022 07:45 )             34.0     10-01    138  |  106  |  12  ----------------------------<  112<H>  3.8   |  28  |  0.90    Ca    8.5      01 Oct 2022 07:45  Mg     1.5     09-30    TPro  6.3  /  Alb  3.2<L>  /  TBili  2.9<H>  /  DBili  x   /  AST  722<H>  /  ALT  660<H>  /  AlkPhos  93  10-01                RADIOLOGY & ADDITIONAL TESTS:    Imaging Personally Reviewed:    Consultant(s) Notes Reviewed:      Care Discussed with Consultants/Other Providers:

## 2022-10-01 NOTE — PROGRESS NOTE ADULT - SUBJECTIVE AND OBJECTIVE BOX
Feels well, denies pain  VSS afeb  lungs- clear  cor- RRR  Abd- + BS soft non tender  Ext- no edema    Bili 2.9

## 2022-10-01 NOTE — CONSULT NOTE ADULT - SUBJECTIVE AND OBJECTIVE BOX
CHIEF COMPLAINT: Patient is a 67y old  Male who presents with a chief complaint of Acute Cholecystitis (01 Oct 2022 09:41)      HPI: HPI:      PMHx: PAST MEDICAL & SURGICAL HISTORY:  DM (diabetes mellitus)      HTN (hypertension)      HLD (hyperlipidemia)      CAD (coronary artery disease)      S/P coronary artery bypass graft x 4            Soc Hx:     FAMILY HISTORY:  No pertinent family history in first degree relatives        Allergies: Allergies    No Known Allergies    Intolerances          REVIEW OF SYSTEMS:    As above  No chest pain or shortness of breath  No lightheadeness or syncope  No leg swelling  No palpitations  No claudication-like symptoms    Vital Signs Last 24 Hrs  T(C): 36.3 (01 Oct 2022 08:54), Max: 37.1 (30 Sep 2022 23:36)  T(F): 97.4 (01 Oct 2022 08:54), Max: 98.8 (30 Sep 2022 23:36)  HR: 62 (01 Oct 2022 08:54) (62 - 67)  BP: 137/48 (01 Oct 2022 08:54) (131/61 - 194/76)  BP(mean): 110 (30 Sep 2022 12:30) (110 - 110)  RR: 17 (01 Oct 2022 08:54) (17 - 19)  SpO2: 100% (01 Oct 2022 08:54) (98% - 100%)    Parameters below as of 01 Oct 2022 08:54  Patient On (Oxygen Delivery Method): room air        I&O's Summary      CAPILLARY BLOOD GLUCOSE      POCT Blood Glucose.: 126 mg/dL (01 Oct 2022 05:22)  POCT Blood Glucose.: 135 mg/dL (30 Sep 2022 23:50)      PHYSICAL EXAM:   Patient in NAD  Neck: No JVD; Carotids:  2+ without bruits  Respiratory:  Clear to A&P  Cardiovascular: S1 and S2, regular rate and rhythm, no Murmurs, gallops or rubs  Gastrointestinal:  Soft, non-tender; BS positive  Extremities: No peripheral edema  Vascular: 2+ peripheral pulses  Neurological: A/O x 3, no focal deficits      MEDICATIONS:  MEDICATIONS  (STANDING):  dextrose 5%. 1000 milliLiter(s) (50 mL/Hr) IV Continuous <Continuous>  dextrose 5%. 1000 milliLiter(s) (100 mL/Hr) IV Continuous <Continuous>  dextrose 50% Injectable 25 Gram(s) IV Push once  dextrose 50% Injectable 12.5 Gram(s) IV Push once  dextrose 50% Injectable 25 Gram(s) IV Push once  glucagon  Injectable 1 milliGRAM(s) IntraMuscular once  heparin   Injectable 5000 Unit(s) SubCutaneous every 12 hours  influenza  Vaccine (HIGH DOSE) 0.7 milliLiter(s) IntraMuscular once  insulin lispro (ADMELOG) corrective regimen sliding scale   SubCutaneous every 6 hours  losartan 50 milliGRAM(s) Oral daily  ondansetron Injectable 4 milliGRAM(s) IV Push once  piperacillin/tazobactam IVPB.. 3.375 Gram(s) IV Intermittent every 8 hours  sodium chloride 0.9%. 1000 milliLiter(s) (125 mL/Hr) IV Continuous <Continuous>      LABS: All Labs Reviewed:  Blood Culture:   BNP   CBC             WBC Count: 6.38 K/uL (10-01 @ 07:45)  WBC Count: 13.08 K/uL (09-30 @ 13:21)              Hemoglobin: 11.1 g/dL (10-01 @ 07:45)  Hemoglobin: 12.8 g/dL (09-30 @ 13:21)              Hematocrit: 34.0 % (10-01 @ 07:45)  Hematocrit: 39.4 % (09-30 @ 13:21)              Mean Cell Volume: 89.5 fl (10-01 @ 07:45)  Mean Cell Volume: 88.9 fl (09-30 @ 13:21)              Platelet Count - Automated: 204 K/uL (10-01 @ 07:45)  Platelet Count - Automated: 238 K/uL (09-30 @ 13:21)                            Cardiac markers   Troponin I, High Sensitivity (09.30.22 @ 13:21) Troponin I, High Sensitivity Result: 8.36                                       Chems        Sodium, Serum: 138 mmol/L (10-01 @ 07:45)  Sodium, Serum: 136 mmol/L (09-30 @ 13:21)          Potassium, Serum: 3.8 mmol/L (10-01 @ 07:45)  Potassium, Serum: 4.4 mmol/L (09-30 @ 13:21)          Blood Urea Nitrogen, Serum: 12 mg/dL (10-01 @ 07:45)  Blood Urea Nitrogen, Serum: 19 mg/dL (09-30 @ 13:21)          Creatinine 0.90  Creatinine 0.92          Magnesium, Serum: 2.3 mg/dL (10-01 @ 07:45)  Magnesium, Serum: 1.5 mg/dL (09-30 @ 13:21)          Protein Total, Serum: 6.3 gm/dL (10-01 @ 07:45)  Protein Total, Serum: 7.8 gm/dL (09-30 @ 13:21)                  Calcium, Total Serum: 8.5 mg/dL (10-01 @ 07:45)  Calcium, Total Serum: 9.1 mg/dL (09-30 @ 13:21)                  Bilirubin Total, Serum: 2.9 mg/dL (10-01 @ 07:45)  Bilirubin Total, Serum: 1.7 mg/dL (09-30 @ 13:21)          Alanine Aminotransferase (ALT/SGPT): 660 U/L (10-01 @ 07:45)  Alanine Aminotransferase (ALT/SGPT): 25 U/L (09-30 @ 13:21)          Aspartate Aminotransferase (AST/SGOT): 722 U/L (10-01 @ 07:45)  Aspartate Aminotransferase (AST/SGOT): 24 U/L (09-30 @ 13:21)                            RADIOLOGY:    EKG:  NSR; RBBB; LAD; small nonsignificant Q's in V1V2    Telemetry:    ECHO:

## 2022-10-02 LAB
ALBUMIN SERPL ELPH-MCNC: 3.1 G/DL — LOW (ref 3.3–5)
ALP SERPL-CCNC: 85 U/L — SIGNIFICANT CHANGE UP (ref 40–120)
ALT FLD-CCNC: 369 U/L — HIGH (ref 12–78)
ANION GAP SERPL CALC-SCNC: 5 MMOL/L — SIGNIFICANT CHANGE UP (ref 5–17)
AST SERPL-CCNC: 212 U/L — HIGH (ref 15–37)
BASOPHILS # BLD AUTO: 0.03 K/UL — SIGNIFICANT CHANGE UP (ref 0–0.2)
BASOPHILS NFR BLD AUTO: 0.6 % — SIGNIFICANT CHANGE UP (ref 0–2)
BILIRUB SERPL-MCNC: 2.4 MG/DL — HIGH (ref 0.2–1.2)
BUN SERPL-MCNC: 11 MG/DL — SIGNIFICANT CHANGE UP (ref 7–23)
CALCIUM SERPL-MCNC: 8.2 MG/DL — LOW (ref 8.5–10.1)
CHLORIDE SERPL-SCNC: 109 MMOL/L — HIGH (ref 96–108)
CO2 SERPL-SCNC: 26 MMOL/L — SIGNIFICANT CHANGE UP (ref 22–31)
CREAT SERPL-MCNC: 0.75 MG/DL — SIGNIFICANT CHANGE UP (ref 0.5–1.3)
EGFR: 99 ML/MIN/1.73M2 — SIGNIFICANT CHANGE UP
EOSINOPHIL # BLD AUTO: 0.07 K/UL — SIGNIFICANT CHANGE UP (ref 0–0.5)
EOSINOPHIL NFR BLD AUTO: 1.4 % — SIGNIFICANT CHANGE UP (ref 0–6)
GLUCOSE SERPL-MCNC: 93 MG/DL — SIGNIFICANT CHANGE UP (ref 70–99)
HCT VFR BLD CALC: 34.3 % — LOW (ref 39–50)
HGB BLD-MCNC: 10.9 G/DL — LOW (ref 13–17)
IMM GRANULOCYTES NFR BLD AUTO: 0.4 % — SIGNIFICANT CHANGE UP (ref 0–0.9)
LYMPHOCYTES # BLD AUTO: 0.55 K/UL — LOW (ref 1–3.3)
LYMPHOCYTES # BLD AUTO: 11 % — LOW (ref 13–44)
MCHC RBC-ENTMCNC: 28.8 PG — SIGNIFICANT CHANGE UP (ref 27–34)
MCHC RBC-ENTMCNC: 31.8 GM/DL — LOW (ref 32–36)
MCV RBC AUTO: 90.7 FL — SIGNIFICANT CHANGE UP (ref 80–100)
MONOCYTES # BLD AUTO: 0.46 K/UL — SIGNIFICANT CHANGE UP (ref 0–0.9)
MONOCYTES NFR BLD AUTO: 9.2 % — SIGNIFICANT CHANGE UP (ref 2–14)
NEUTROPHILS # BLD AUTO: 3.89 K/UL — SIGNIFICANT CHANGE UP (ref 1.8–7.4)
NEUTROPHILS NFR BLD AUTO: 77.4 % — HIGH (ref 43–77)
PLATELET # BLD AUTO: 195 K/UL — SIGNIFICANT CHANGE UP (ref 150–400)
POTASSIUM SERPL-MCNC: 4.2 MMOL/L — SIGNIFICANT CHANGE UP (ref 3.5–5.3)
POTASSIUM SERPL-SCNC: 4.2 MMOL/L — SIGNIFICANT CHANGE UP (ref 3.5–5.3)
PROT SERPL-MCNC: 6.1 GM/DL — SIGNIFICANT CHANGE UP (ref 6–8.3)
RBC # BLD: 3.78 M/UL — LOW (ref 4.2–5.8)
RBC # FLD: 13.2 % — SIGNIFICANT CHANGE UP (ref 10.3–14.5)
SODIUM SERPL-SCNC: 140 MMOL/L — SIGNIFICANT CHANGE UP (ref 135–145)
WBC # BLD: 5.02 K/UL — SIGNIFICANT CHANGE UP (ref 3.8–10.5)
WBC # FLD AUTO: 5.02 K/UL — SIGNIFICANT CHANGE UP (ref 3.8–10.5)

## 2022-10-02 PROCEDURE — 88304 TISSUE EXAM BY PATHOLOGIST: CPT | Mod: 26

## 2022-10-02 PROCEDURE — 99232 SBSQ HOSP IP/OBS MODERATE 35: CPT

## 2022-10-02 RX ORDER — HYDRALAZINE HCL 50 MG
5 TABLET ORAL ONCE
Refills: 0 | Status: COMPLETED | OUTPATIENT
Start: 2022-10-02 | End: 2022-10-02

## 2022-10-02 RX ORDER — SODIUM CHLORIDE 9 MG/ML
1000 INJECTION INTRAMUSCULAR; INTRAVENOUS; SUBCUTANEOUS
Refills: 0 | Status: DISCONTINUED | OUTPATIENT
Start: 2022-10-02 | End: 2022-10-03

## 2022-10-02 RX ORDER — HYDROMORPHONE HYDROCHLORIDE 2 MG/ML
1 INJECTION INTRAMUSCULAR; INTRAVENOUS; SUBCUTANEOUS EVERY 4 HOURS
Refills: 0 | Status: DISCONTINUED | OUTPATIENT
Start: 2022-10-02 | End: 2022-10-03

## 2022-10-02 RX ORDER — HYDROMORPHONE HYDROCHLORIDE 2 MG/ML
0.5 INJECTION INTRAMUSCULAR; INTRAVENOUS; SUBCUTANEOUS
Refills: 0 | Status: DISCONTINUED | OUTPATIENT
Start: 2022-10-02 | End: 2022-10-02

## 2022-10-02 RX ORDER — FENTANYL CITRATE 50 UG/ML
50 INJECTION INTRAVENOUS
Refills: 0 | Status: DISCONTINUED | OUTPATIENT
Start: 2022-10-02 | End: 2022-10-02

## 2022-10-02 RX ORDER — ACETAMINOPHEN 500 MG
1000 TABLET ORAL ONCE
Refills: 0 | Status: DISCONTINUED | OUTPATIENT
Start: 2022-10-02 | End: 2022-10-02

## 2022-10-02 RX ORDER — SODIUM CHLORIDE 9 MG/ML
1000 INJECTION, SOLUTION INTRAVENOUS
Refills: 0 | Status: DISCONTINUED | OUTPATIENT
Start: 2022-10-02 | End: 2022-10-02

## 2022-10-02 RX ORDER — OXYCODONE HYDROCHLORIDE 5 MG/1
5 TABLET ORAL ONCE
Refills: 0 | Status: DISCONTINUED | OUTPATIENT
Start: 2022-10-02 | End: 2022-10-02

## 2022-10-02 RX ORDER — OXYCODONE AND ACETAMINOPHEN 5; 325 MG/1; MG/1
1 TABLET ORAL EVERY 4 HOURS
Refills: 0 | Status: DISCONTINUED | OUTPATIENT
Start: 2022-10-02 | End: 2022-10-03

## 2022-10-02 RX ADMIN — LOSARTAN POTASSIUM 50 MILLIGRAM(S): 100 TABLET, FILM COATED ORAL at 10:12

## 2022-10-02 RX ADMIN — PIPERACILLIN AND TAZOBACTAM 25 GRAM(S): 4; .5 INJECTION, POWDER, LYOPHILIZED, FOR SOLUTION INTRAVENOUS at 00:08

## 2022-10-02 RX ADMIN — PIPERACILLIN AND TAZOBACTAM 25 GRAM(S): 4; .5 INJECTION, POWDER, LYOPHILIZED, FOR SOLUTION INTRAVENOUS at 17:25

## 2022-10-02 RX ADMIN — HYDROMORPHONE HYDROCHLORIDE 0.5 MILLIGRAM(S): 2 INJECTION INTRAMUSCULAR; INTRAVENOUS; SUBCUTANEOUS at 13:55

## 2022-10-02 RX ADMIN — Medication 2: at 17:26

## 2022-10-02 RX ADMIN — OXYCODONE HYDROCHLORIDE 5 MILLIGRAM(S): 5 TABLET ORAL at 14:20

## 2022-10-02 RX ADMIN — SODIUM CHLORIDE 100 MILLILITER(S): 9 INJECTION INTRAMUSCULAR; INTRAVENOUS; SUBCUTANEOUS at 14:10

## 2022-10-02 RX ADMIN — Medication 5 MILLIGRAM(S): at 14:12

## 2022-10-02 RX ADMIN — PIPERACILLIN AND TAZOBACTAM 25 GRAM(S): 4; .5 INJECTION, POWDER, LYOPHILIZED, FOR SOLUTION INTRAVENOUS at 10:12

## 2022-10-02 RX ADMIN — HEPARIN SODIUM 5000 UNIT(S): 5000 INJECTION INTRAVENOUS; SUBCUTANEOUS at 20:08

## 2022-10-02 NOTE — PROGRESS NOTE ADULT - SUBJECTIVE AND OBJECTIVE BOX
Patient is a 67y old  Male who presents with a chief complaint of Acute Cholecystitis (30 Sep 2022 22:55)      SUBJECTIVE / OVERNIGHT EVENTS:  10/1: pt has no new c/o, pain much improved. He's not sure if he had TTE w/ his cardiologist, but states he had cxr. I called his cardio in [place], [phone], s/w on-call cardio, Dr Nicole, who endorsed that there's no record of any recent TTE for pt.  Pt denies any cp, sob.      10/2/22- NPO for OR    Vital Signs Last 24 Hrs  T(C): 36.7 (02 Oct 2022 08:38), Max: 37 (01 Oct 2022 18:36)  T(F): 98 (02 Oct 2022 08:38), Max: 98.6 (01 Oct 2022 18:36)  HR: 53 (01 Oct 2022 23:59) (50 - 53)  BP: 152/70 (02 Oct 2022 08:38) (110/66 - 154/64)  BP(mean): --  RR: 18 (02 Oct 2022 08:38) (16 - 18)  SpO2: 100% (02 Oct 2022 08:38) (98% - 100%)    Parameters below as of 02 Oct 2022 08:38  Patient On (Oxygen Delivery Method): room air    PHYSICAL EXAM  GENERAL: NAD,   HEAD:  Atraumatic, Normocephalic  EYES: EOMI, PERRLA, conjunctiva and sclera clear  NECK: Supple, No JVD, no LAD  CHEST/LUNG: Clear to auscultation bilaterally; No wheeze, resp unlabored  HEART: Regular rate and rhythm; No murmurs, rubs, or gallops  ABDOMEN: Soft, Nontender, Nondistended; Bowel sounds present, no HSM  EXTREMITIES:  2+ Peripheral Pulses, No clubbing, cyanosis, or edema  PSYCH: AAOx3, normal behavior  NEUROLOGY: non-focal, sensory and cn 2-12 intact  SKIN: No visible rashes or lesions    LABS:                        10.9   5.02  )-----------( 195      ( 02 Oct 2022 07:36 )             34.3     02 Oct 2022 07:36    140    |  109    |  11     ----------------------------<  93     4.2     |  26     |  0.75     Ca    8.2        02 Oct 2022 07:36  Mg     2.3       01 Oct 2022 07:45    TPro  6.1    /  Alb  3.1    /  TBili  2.4    /  DBili  x      /  AST  212    /  ALT  369    /  AlkPhos  85     02 Oct 2022 07:36    LIVER FUNCTIONS - ( 02 Oct 2022 07:36 )  Alb: 3.1 g/dL / Pro: 6.1 gm/dL / ALK PHOS: 85 U/L / ALT: 369 U/L / AST: 212 U/L / GGT: x           CAPILLARY BLOOD GLUCOSE  POCT Blood Glucose.: 80 mg/dL (02 Oct 2022 05:53)  POCT Blood Glucose.: 88 mg/dL (02 Oct 2022 00:08)  POCT Blood Glucose.: 91 mg/dL (01 Oct 2022 13:35)    RADIOLOGY & ADDITIONAL TESTS:  MR MRCP WAW                           PROCEDURE DATE:  10/01/2022          INTERPRETATION:  CLINICAL INFORMATION: Acute cholecystitis. Evaluate for   common bile duct stone.    COMPARISON: Right upper quadrant ultrasound dated 09/30/2022 and   abdominal CT dated 09/30/2022.    CONTRAST/COMPLICATIONS:  IV Contrast: Gadavist  7 cc administered   .5 cc discarded  Oral Contrast: NONE  Complications: None reported at time of study completion    PROCEDURE:  MRI of the abdomen was performed.  MRCP was performed.    FINDINGS:  LOWER CHEST: Within normal limits.    LIVER: Within normal limits.  BILE DUCTS: Normal caliber. The common bile duct measures up to 5 mm. No   stones identified. There is no intrahepatic biliary dilatation.  GALLBLADDER: Cholelithiasis and mild gallbladder wall edema.  SPLEEN: Within normal limits.  PANCREAS: There is pancreatic divisum. The pancreas is otherwise   unremarkable.  ADRENALS: Within normal limits.  KIDNEYS/URETERS: Within normal limits.    VISUALIZED PORTIONS:  BOWEL: Within normal limits.  PERITONEUM: No ascites.  VESSELS: Within normal limits.  RETROPERITONEUM/LYMPH NODES: No lymphadenopathy.  ABDOMINAL WALL: Within normal limits.  BONES: Within normal limits.    IMPRESSION:  Cholelithiasis and mild gallbladder wall edema..    No evidence of biliary dilatation or choledocholithiasis.    Pancreatic divisum.    MEDS:  MEDICATIONS  (STANDING):  dextrose 5%. 1000 milliLiter(s) (50 mL/Hr) IV Continuous <Continuous>  dextrose 5%. 1000 milliLiter(s) (100 mL/Hr) IV Continuous <Continuous>  dextrose 50% Injectable 25 Gram(s) IV Push once  dextrose 50% Injectable 12.5 Gram(s) IV Push once  dextrose 50% Injectable 25 Gram(s) IV Push once  glucagon  Injectable 1 milliGRAM(s) IntraMuscular once  heparin   Injectable 5000 Unit(s) SubCutaneous every 12 hours  influenza  Vaccine (HIGH DOSE) 0.7 milliLiter(s) IntraMuscular once  insulin lispro (ADMELOG) corrective regimen sliding scale   SubCutaneous every 6 hours  losartan 50 milliGRAM(s) Oral daily  ondansetron Injectable 4 milliGRAM(s) IV Push once  piperacillin/tazobactam IVPB.. 3.375 Gram(s) IV Intermittent every 8 hours  sodium chloride 0.9%. 1000 milliLiter(s) (125 mL/Hr) IV Continuous <Continuous>    MEDICATIONS  (PRN):  dextrose Oral Gel 15 Gram(s) Oral once PRN Blood Glucose LESS THAN 70 milliGRAM(s)/deciliter  HYDROmorphone  Injectable 0.5 milliGRAM(s) IV Push every 3 hours PRN Moderate Pain (4 - 6)  HYDROmorphone  Injectable 1 milliGRAM(s) IV Push every 4 hours PRN Severe Pain (7 - 10)

## 2022-10-03 VITALS
SYSTOLIC BLOOD PRESSURE: 134 MMHG | HEART RATE: 70 BPM | OXYGEN SATURATION: 98 % | DIASTOLIC BLOOD PRESSURE: 67 MMHG | RESPIRATION RATE: 16 BRPM

## 2022-10-03 LAB
ALBUMIN SERPL ELPH-MCNC: 3.2 G/DL — LOW (ref 3.3–5)
ALP SERPL-CCNC: 82 U/L — SIGNIFICANT CHANGE UP (ref 40–120)
ALT FLD-CCNC: 267 U/L — HIGH (ref 12–78)
ANION GAP SERPL CALC-SCNC: 6 MMOL/L — SIGNIFICANT CHANGE UP (ref 5–17)
AST SERPL-CCNC: 101 U/L — HIGH (ref 15–37)
BASOPHILS # BLD AUTO: 0.01 K/UL — SIGNIFICANT CHANGE UP (ref 0–0.2)
BASOPHILS NFR BLD AUTO: 0.1 % — SIGNIFICANT CHANGE UP (ref 0–2)
BILIRUB DIRECT SERPL-MCNC: 0.4 MG/DL — HIGH (ref 0–0.3)
BILIRUB INDIRECT FLD-MCNC: 1.1 MG/DL — HIGH (ref 0.2–1)
BILIRUB SERPL-MCNC: 1.5 MG/DL — HIGH (ref 0.2–1.2)
BUN SERPL-MCNC: 13 MG/DL — SIGNIFICANT CHANGE UP (ref 7–23)
CALCIUM SERPL-MCNC: 8.4 MG/DL — LOW (ref 8.5–10.1)
CHLORIDE SERPL-SCNC: 107 MMOL/L — SIGNIFICANT CHANGE UP (ref 96–108)
CO2 SERPL-SCNC: 25 MMOL/L — SIGNIFICANT CHANGE UP (ref 22–31)
CREAT SERPL-MCNC: 0.94 MG/DL — SIGNIFICANT CHANGE UP (ref 0.5–1.3)
EGFR: 89 ML/MIN/1.73M2 — SIGNIFICANT CHANGE UP
EOSINOPHIL # BLD AUTO: 0.01 K/UL — SIGNIFICANT CHANGE UP (ref 0–0.5)
EOSINOPHIL NFR BLD AUTO: 0.1 % — SIGNIFICANT CHANGE UP (ref 0–6)
GLUCOSE SERPL-MCNC: 131 MG/DL — HIGH (ref 70–99)
HCT VFR BLD CALC: 33.5 % — LOW (ref 39–50)
HGB BLD-MCNC: 10.8 G/DL — LOW (ref 13–17)
IMM GRANULOCYTES NFR BLD AUTO: 0.2 % — SIGNIFICANT CHANGE UP (ref 0–0.9)
LYMPHOCYTES # BLD AUTO: 0.87 K/UL — LOW (ref 1–3.3)
LYMPHOCYTES # BLD AUTO: 9.2 % — LOW (ref 13–44)
MCHC RBC-ENTMCNC: 28.7 PG — SIGNIFICANT CHANGE UP (ref 27–34)
MCHC RBC-ENTMCNC: 32.2 GM/DL — SIGNIFICANT CHANGE UP (ref 32–36)
MCV RBC AUTO: 89.1 FL — SIGNIFICANT CHANGE UP (ref 80–100)
MONOCYTES # BLD AUTO: 0.82 K/UL — SIGNIFICANT CHANGE UP (ref 0–0.9)
MONOCYTES NFR BLD AUTO: 8.7 % — SIGNIFICANT CHANGE UP (ref 2–14)
NEUTROPHILS # BLD AUTO: 7.73 K/UL — HIGH (ref 1.8–7.4)
NEUTROPHILS NFR BLD AUTO: 81.7 % — HIGH (ref 43–77)
PLATELET # BLD AUTO: 216 K/UL — SIGNIFICANT CHANGE UP (ref 150–400)
POTASSIUM SERPL-MCNC: 3.9 MMOL/L — SIGNIFICANT CHANGE UP (ref 3.5–5.3)
POTASSIUM SERPL-SCNC: 3.9 MMOL/L — SIGNIFICANT CHANGE UP (ref 3.5–5.3)
PROT SERPL-MCNC: 6.4 GM/DL — SIGNIFICANT CHANGE UP (ref 6–8.3)
RBC # BLD: 3.76 M/UL — LOW (ref 4.2–5.8)
RBC # FLD: 13.1 % — SIGNIFICANT CHANGE UP (ref 10.3–14.5)
SODIUM SERPL-SCNC: 138 MMOL/L — SIGNIFICANT CHANGE UP (ref 135–145)
WBC # BLD: 9.46 K/UL — SIGNIFICANT CHANGE UP (ref 3.8–10.5)
WBC # FLD AUTO: 9.46 K/UL — SIGNIFICANT CHANGE UP (ref 3.8–10.5)

## 2022-10-03 PROCEDURE — 99232 SBSQ HOSP IP/OBS MODERATE 35: CPT

## 2022-10-03 PROCEDURE — 99222 1ST HOSP IP/OBS MODERATE 55: CPT

## 2022-10-03 RX ORDER — INSULIN LISPRO 100/ML
VIAL (ML) SUBCUTANEOUS
Refills: 0 | Status: DISCONTINUED | OUTPATIENT
Start: 2022-10-03 | End: 2022-10-03

## 2022-10-03 RX ORDER — INSULIN LISPRO 100/ML
VIAL (ML) SUBCUTANEOUS AT BEDTIME
Refills: 0 | Status: DISCONTINUED | OUTPATIENT
Start: 2022-10-03 | End: 2022-10-03

## 2022-10-03 RX ADMIN — HEPARIN SODIUM 5000 UNIT(S): 5000 INJECTION INTRAVENOUS; SUBCUTANEOUS at 09:18

## 2022-10-03 RX ADMIN — PIPERACILLIN AND TAZOBACTAM 25 GRAM(S): 4; .5 INJECTION, POWDER, LYOPHILIZED, FOR SOLUTION INTRAVENOUS at 00:33

## 2022-10-03 RX ADMIN — OXYCODONE AND ACETAMINOPHEN 1 TABLET(S): 5; 325 TABLET ORAL at 01:10

## 2022-10-03 RX ADMIN — PIPERACILLIN AND TAZOBACTAM 25 GRAM(S): 4; .5 INJECTION, POWDER, LYOPHILIZED, FOR SOLUTION INTRAVENOUS at 09:19

## 2022-10-03 RX ADMIN — Medication 2: at 11:31

## 2022-10-03 RX ADMIN — OXYCODONE AND ACETAMINOPHEN 1 TABLET(S): 5; 325 TABLET ORAL at 05:36

## 2022-10-03 RX ADMIN — OXYCODONE AND ACETAMINOPHEN 1 TABLET(S): 5; 325 TABLET ORAL at 00:33

## 2022-10-03 RX ADMIN — INFLUENZA VIRUS VACCINE 0.7 MILLILITER(S): 15; 15; 15; 15 SUSPENSION INTRAMUSCULAR at 11:31

## 2022-10-03 RX ADMIN — LOSARTAN POTASSIUM 50 MILLIGRAM(S): 100 TABLET, FILM COATED ORAL at 09:19

## 2022-10-03 NOTE — DISCHARGE NOTE PROVIDER - NSDCMRMEDTOKEN_GEN_ALL_CORE_FT
Aspir 81 oral delayed release tablet: 1 tab(s) orally once a day (in the morning)  atorvastatin 80 mg oral tablet: 1 tab(s) orally once a day (in the evening)  Januvia 50 mg oral tablet: 1 tab(s) orally once a day (in the morning)  losartan 50 mg oral tablet: 1 tab(s) orally once a day (in the morning)  metFORMIN 1000 mg oral tablet: 2 tab(s) orally 2 times a day  oxycodone-acetaminophen 5 mg-325 mg oral tablet: 1 tab(s) orally every 6 hours, As Needed -Moderate Pain (4 - 6) MDD:4 Tabs

## 2022-10-03 NOTE — DISCHARGE NOTE NURSING/CASE MANAGEMENT/SOCIAL WORK - NSDCVIVACCINE_GEN_ALL_CORE_FT
influenza, high-dose, quadrivalent; 03-Oct-2022 11:31; Hossein Voss (RN); [employer]; [id number] (Exp. Date: 30-Jun-2023); IntraMuscular; Deltoid Right.; 0.7 milliLiter(s); VIS (VIS Published: 06-Aug-2021, VIS Presented: 03-Oct-2022);

## 2022-10-03 NOTE — CONSULT NOTE ADULT - ASSESSMENT
Imp: downtrending LFT's s/p lap choley POD#1 with no evidence of biliary dilatation or choledocholithiasis on MR  MR further reviewed by Dr. Lara    Rec:  ::Stable, no need for ERCP, resolving  ::Discussed with surgical team
67 year old man with the above PMH admitted with acute cholecystitis, scheduled for surgery.  He appears stable from a cardiac standpoint and there are no cardiac contraindications to the proposed surgery.
67M w/PMH CABG 2019, T2DM, visiting his father from [place], presents w/ +acute eloisa  In ED, Mg 1.5, wbc 13, given IV zosyn, dilaudid, US abdomen: Distended gallbladder containing stones and sludge. Wall thickness is normal. If there is persistent clinical concern for acute cholecystitis recommend HIDA scan. CT a/p Multiple suspected jejunal polyps. Is there history of familial polyposis syndrome? Recommend CT confirmation with enterography and GI consultation. Cholelithiasis.    #T2DM  - hold oral meds  - start to monitor FS and place on SSI- pt declines insulin, but "will think about it"  - check HA1c    #HTN- elevated bp likely d/t pain  - resume home meds and monitor bp    #hypomagnesemia  - replete and recheck AM labs    #CAD- denies cp, sob  - resume home meds- statin, asa (no bb d/t dorina)  - cardio cs per surgery for clearance  - pt had TTE done w/ cardiologist in [place] 2 days ago- will be difficult to obtain over the weekend    #Cholecystitis  - planned for OR in AM  - IV zosyn   - NPO >MN  - Pt is MEDICALLY OPTIMIZED FOR SURGERY     #PPX- ambulate

## 2022-10-03 NOTE — CONSULT NOTE ADULT - SUBJECTIVE AND OBJECTIVE BOX
Patient is a 67y old  Male who presents with a chief complaint of Acute Cholecystitis      HPI:  This is a 67 year old male with history DM, CAD with CABG/stent, HTN, HLD presented with abdominal pain and elevated LFT's with imaging + acute cholecystitis. Now POD#1 lap ramon. Advanced endoscopy consulted for evaluation possible need for ERCP. Patient seen at bedside. Denies acute pain. Tolerating diet. Does admit he has hysphagia r/t osteophytes diffuse in c-spine. But denies nausea, vomiting, odynophagia, abdominal pain. LFT's downtrending.       PAST MEDICAL & SURGICAL HISTORY:  DM (diabetes mellitus)      HTN (hypertension)      HLD (hyperlipidemia)      CAD (coronary artery disease)      S/P coronary artery bypass graft x 4          MEDICATIONS  (STANDING):  dextrose 5%. 1000 milliLiter(s) (50 mL/Hr) IV Continuous <Continuous>  dextrose 5%. 1000 milliLiter(s) (100 mL/Hr) IV Continuous <Continuous>  dextrose 50% Injectable 12.5 Gram(s) IV Push once  dextrose 50% Injectable 25 Gram(s) IV Push once  dextrose 50% Injectable 25 Gram(s) IV Push once  glucagon  Injectable 1 milliGRAM(s) IntraMuscular once  heparin   Injectable 5000 Unit(s) SubCutaneous every 12 hours  insulin lispro (ADMELOG) corrective regimen sliding scale   SubCutaneous three times a day before meals  insulin lispro (ADMELOG) corrective regimen sliding scale   SubCutaneous at bedtime  losartan 50 milliGRAM(s) Oral daily  ondansetron Injectable 4 milliGRAM(s) IV Push once  piperacillin/tazobactam IVPB.. 3.375 Gram(s) IV Intermittent every 8 hours  sodium chloride 0.9%. 1000 milliLiter(s) (125 mL/Hr) IV Continuous <Continuous>  sodium chloride 0.9%. 1000 milliLiter(s) (100 mL/Hr) IV Continuous <Continuous>    MEDICATIONS  (PRN):  dextrose Oral Gel 15 Gram(s) Oral once PRN Blood Glucose LESS THAN 70 milliGRAM(s)/deciliter  HYDROmorphone  Injectable 1 milliGRAM(s) IV Push every 4 hours PRN Severe Pain (7 - 10)  HYDROmorphone  Injectable 0.5 milliGRAM(s) IV Push every 3 hours PRN Moderate Pain (4 - 6)  HYDROmorphone  Injectable 1 milliGRAM(s) IV Push every 4 hours PRN Severe Pain (7 - 10)  oxycodone    5 mG/acetaminophen 325 mG 1 Tablet(s) Oral every 4 hours PRN Moderate Pain (4 - 6)      Allergies    No Known Allergies    Intolerances        SOCIAL HISTORY:    FAMILY HISTORY:  No pertinent family history in first degree relatives        REVIEW OF SYSTEMS:    CONSTITUTIONAL: No weakness, fevers or chills  EYES/ENT: No visual changes;  No vertigo or throat pain   NECK: No pain or stiffness  RESPIRATORY: No cough, wheezing, hemoptysis; No shortness of breath  CARDIOVASCULAR: No chest pain or palpitations  GASTROINTESTINAL: See HPI  GENITOURINARY: No dysuria, frequency or hematuria  NEUROLOGICAL: No numbness or weakness  SKIN: No itching, burning, rashes, or lesions   PSYCH: Normal mood and affect  All other review of systems is negative unless indicated above.    Vital Signs Last 24 Hrs  T(C): 36.3 (03 Oct 2022 08:19), Max: 36.9 (03 Oct 2022 00:25)  T(F): 97.4 (03 Oct 2022 08:19), Max: 98.5 (03 Oct 2022 00:25)  HR: 50 (03 Oct 2022 08:19) (50 - 65)  BP: 156/62 (03 Oct 2022 08:19) (114/47 - 156/62)  BP(mean): --  RR: 16 (03 Oct 2022 08:19) (16 - 18)  SpO2: 95% (03 Oct 2022 08:19) (95% - 100%)    Parameters below as of 03 Oct 2022 08:19  Patient On (Oxygen Delivery Method): room air        PHYSICAL EXAM:    Constitutional: No acute distress, well-developed, non-toxic appearing  HEENT: masked, good phonation, not icteric  Neck: supple, no lymphadenopathy  Respiratory: clear to ascultation bilaterally, no wheezing  Cardiovascular: S1 and S2, regular rate and rhythm, no murmurs rubs or gallops  Gastrointestinal: soft, non-tender, non-distended, +bowel sounds, no rebound or guarding, lap sites CDI  Extremities: No peripheral edema, no cyanosis or clubbing  Vascular: 2+ peripheral pulses, no venous stasis  Neurological: A/O x 3, no focal deficits, no asterixis  Psychiatric: Normal mood, normal affect  Skin: No rashes, not jaundiced    LABS:                        10.8   9.46  )-----------( 216      ( 03 Oct 2022 07:41 )             33.5     10-03    138  |  107  |  13  ----------------------------<  131<H>  3.9   |  25  |  0.94    Ca    8.4<L>      03 Oct 2022 07:41    TPro  6.4  /  Alb  3.2<L>  /  TBili  1.5<H>  /  DBili  0.4<H>  /  AST  101<H>  /  ALT  267<H>  /  AlkPhos  82  10-03      LIVER FUNCTIONS - ( 03 Oct 2022 07:41 )  Alb: 3.2 g/dL / Pro: 6.4 gm/dL / ALK PHOS: 82 U/L / ALT: 267 U/L / AST: 101 U/L / GGT: x             RADIOLOGY & ADDITIONAL STUDIES:  IMPRESSION:  Cholelithiasis and mild gallbladder wall edema..    No evidence of biliary dilatation or choledocholithiasis. Patient is a 67y old  Male who presents with a chief complaint of Acute Cholecystitis      This is a 67 year old male with history DM, CAD with CABG/stent, HTN, HLD presented with abdominal pain and elevated LFT's with imaging + acute cholecystitis. Now POD#1 lap choly. Advanced endoscopy consulted for evaluation possible need for ERCP.     Patient seen at bedside. Denies acute pain, symptoms much improved after surgery. Just some soreness at his lap sites. No nausea or vomiting. Tolerating PO. No post prandial issues other than chronic dysphagis secondary to osteophytes but this is not worse and is a chronic issue. No overt signs of GI bleeding like hematemesis, melena, hematochezia.      PAST MEDICAL & SURGICAL HISTORY:  DM (diabetes mellitus)      HTN (hypertension)      HLD (hyperlipidemia)      CAD (coronary artery disease)      S/P coronary artery bypass graft x 4          MEDICATIONS  (STANDING):  dextrose 5%. 1000 milliLiter(s) (50 mL/Hr) IV Continuous <Continuous>  dextrose 5%. 1000 milliLiter(s) (100 mL/Hr) IV Continuous <Continuous>  dextrose 50% Injectable 12.5 Gram(s) IV Push once  dextrose 50% Injectable 25 Gram(s) IV Push once  dextrose 50% Injectable 25 Gram(s) IV Push once  glucagon  Injectable 1 milliGRAM(s) IntraMuscular once  heparin   Injectable 5000 Unit(s) SubCutaneous every 12 hours  insulin lispro (ADMELOG) corrective regimen sliding scale   SubCutaneous three times a day before meals  insulin lispro (ADMELOG) corrective regimen sliding scale   SubCutaneous at bedtime  losartan 50 milliGRAM(s) Oral daily  ondansetron Injectable 4 milliGRAM(s) IV Push once  piperacillin/tazobactam IVPB.. 3.375 Gram(s) IV Intermittent every 8 hours  sodium chloride 0.9%. 1000 milliLiter(s) (125 mL/Hr) IV Continuous <Continuous>  sodium chloride 0.9%. 1000 milliLiter(s) (100 mL/Hr) IV Continuous <Continuous>    MEDICATIONS  (PRN):  dextrose Oral Gel 15 Gram(s) Oral once PRN Blood Glucose LESS THAN 70 milliGRAM(s)/deciliter  HYDROmorphone  Injectable 1 milliGRAM(s) IV Push every 4 hours PRN Severe Pain (7 - 10)  HYDROmorphone  Injectable 0.5 milliGRAM(s) IV Push every 3 hours PRN Moderate Pain (4 - 6)  HYDROmorphone  Injectable 1 milliGRAM(s) IV Push every 4 hours PRN Severe Pain (7 - 10)  oxycodone    5 mG/acetaminophen 325 mG 1 Tablet(s) Oral every 4 hours PRN Moderate Pain (4 - 6)      Allergies    No Known Allergies    Intolerances    SOCIAL HISTORY:  no smoking, drinking or drugs    FAMILY HISTORY:  No pertinent family history in first degree relatives  no colon or stomach cancer        REVIEW OF SYSTEMS:    CONSTITUTIONAL: No weakness, fevers or chills  EYES/ENT: No visual changes;  No vertigo or throat pain   NECK: No pain or stiffness  RESPIRATORY: No cough, wheezing, hemoptysis; No shortness of breath  CARDIOVASCULAR: No chest pain or palpitations  GASTROINTESTINAL: See HPI  GENITOURINARY: No dysuria, frequency or hematuria  NEUROLOGICAL: No numbness or weakness  SKIN: No itching, burning, rashes, or lesions   PSYCH: Normal mood and affect  All other review of systems is negative unless indicated above.    Vital Signs Last 24 Hrs  T(C): 36.3 (03 Oct 2022 08:19), Max: 36.9 (03 Oct 2022 00:25)  T(F): 97.4 (03 Oct 2022 08:19), Max: 98.5 (03 Oct 2022 00:25)  HR: 50 (03 Oct 2022 08:19) (50 - 65)  BP: 156/62 (03 Oct 2022 08:19) (114/47 - 156/62)  BP(mean): --  RR: 16 (03 Oct 2022 08:19) (16 - 18)  SpO2: 95% (03 Oct 2022 08:19) (95% - 100%)    Parameters below as of 03 Oct 2022 08:19  Patient On (Oxygen Delivery Method): room air        PHYSICAL EXAM:    Constitutional: No acute distress, well-developed, non-toxic appearing  HEENT: masked, good phonation, not icteric  Neck: supple, no lymphadenopathy  Respiratory: clear to ascultation bilaterally, no wheezing  Cardiovascular: S1 and S2, regular rate and rhythm, no murmurs rubs or gallops  Gastrointestinal: soft, non-tender, non-distended, +bowel sounds, no rebound or guarding, lap sites CDI without erythema  Extremities: No peripheral edema, no cyanosis or clubbing  Vascular: 2+ peripheral pulses, no venous stasis  Neurological: A/O x 3, no focal deficits, no asterixis  Psychiatric: Normal mood, normal affect  Skin: No rashes, not jaundiced    LABS:                        10.8   9.46  )-----------( 216      ( 03 Oct 2022 07:41 )             33.5     10-03    138  |  107  |  13  ----------------------------<  131<H>  3.9   |  25  |  0.94    Ca    8.4<L>      03 Oct 2022 07:41    TPro  6.4  /  Alb  3.2<L>  /  TBili  1.5<H>  /  DBili  0.4<H>  /  AST  101<H>  /  ALT  267<H>  /  AlkPhos  82  10-03      LIVER FUNCTIONS - ( 03 Oct 2022 07:41 )  Alb: 3.2 g/dL / Pro: 6.4 gm/dL / ALK PHOS: 82 U/L / ALT: 267 U/L / AST: 101 U/L / GGT: x             RADIOLOGY & ADDITIONAL STUDIES:  IMPRESSION:  Cholelithiasis and mild gallbladder wall edema..    No evidence of biliary dilatation or choledocholithiasis.    MRCP without filling defects or dilation

## 2022-10-03 NOTE — DISCHARGE NOTE PROVIDER - CARE PROVIDER_API CALL
Andrew Lubin  SURGERY  [address] [place] [address]  Phone: [phone]  Fax: [phone]  Follow Up Time: 2 weeks    PMD,   F/U LFT  Phone: (   )    -  Fax: (   )    -  Follow Up Time: 1 week

## 2022-10-03 NOTE — DISCHARGE NOTE NURSING/CASE MANAGEMENT/SOCIAL WORK - PATIENT PORTAL LINK FT
You can access the FollowMyHealth Patient Portal offered by [facility] by registering at the following website: [email or web address]. By joining [facility]’s FollowMyHealth portal, you will also be able to view your health information using other applications (apps) compatible with our system.

## 2022-10-03 NOTE — DISCHARGE NOTE PROVIDER - HOSPITAL COURSE
#Acute gallstone cholecystitis  S/P lap eloisa, POD#1  Surg team following  Pain control with Oxy 5 mg/percocet  IV Zosyn  CCD  Stable, no need for ERCP, Downtrending LFT. resolving Transaminitis.  F/U LFT out pt with PMD      #T2DM  - hold oral meds- resume upon d/c  - BGM/SS ac/hs  - check HA1c- 6.1    #HTN- better now   - c/w losartan and monitor bp    #hypomagnesemia- improved  - resolved    #transaminitis   downtrending    #CAD/HTN  - cont statin, asa, arb

## 2022-10-03 NOTE — PROGRESS NOTE ADULT - SUBJECTIVE AND OBJECTIVE BOX
Patient is a 67y old  Male who presents with a chief complaint of Acute Cholecystitis (30 Sep 2022 22:55)      SUBJECTIVE / OVERNIGHT EVENTS:  10/3: pt has no new c/o, pain much improved. feels well shalonda po, No N/V, minimal gas, no BM    Vital Signs Last 24 Hrs  T(C): 36.3 (10-03-22 @ 08:19), Max: 36.9 (10-03-22 @ 00:25)  T(F): 97.4 (10-03-22 @ 08:19), Max: 98.5 (10-03-22 @ 00:25)  HR: 50 (10-03-22 @ 08:19) (45 - 98)  BP: 156/62 (10-03-22 @ 08:19) (114/47 - 194/79)  BP(mean): --  RR: 16 (10-03-22 @ 08:19) (12 - 18)  SpO2: 95% (10-03-22 @ 08:19) (95% - 100%)        PHYSICAL EXAM  GENERAL: NAD,   HEAD:  Atraumatic, Normocephalic  EYES: EOMI, PERRLA, conjunctiva and sclera clear  NECK: Supple, No JVD, no LAD  CHEST/LUNG: Clear to auscultation bilaterally; No wheeze, resp unlabored  HEART: Regular rate and rhythm; No murmurs, rubs, or gallops  ABDOMEN: Soft, mild post op tenderness, slightly distended Bowel sounds present,  no flatus, no BM  EXTREMITIES:  2+ Peripheral Pulses, No clubbing, cyanosis, or edema  PSYCH: AAOx3, normal behavior  NEUROLOGY: non-focal, sensory and cn 2-12 intact  SKIN: No visible rashes or lesions    LABS:                                         10.8   9.46  )-----------( 216      ( 03 Oct 2022 07:41 )             33.5     10-03    138  |  107  |  13  ----------------------------<  131<H>  3.9   |  25  |  0.94    Ca    8.4<L>      03 Oct 2022 07:41    TPro  6.4  /  Alb  3.2<L>  /  TBili  1.5<H>  /  DBili  0.4<H>  /  AST  101<H>  /  ALT  267<H>  /  AlkPhos  82  10-03          CAPILLARY BLOOD GLUCOSE  POCT Blood Glucose.: 80 mg/dL (02 Oct 2022 05:53)  POCT Blood Glucose.: 88 mg/dL (02 Oct 2022 00:08)  POCT Blood Glucose.: 91 mg/dL (01 Oct 2022 13:35)    RADIOLOGY & ADDITIONAL TESTS:  MR MRCP WAW IC                          PROCEDURE DATE:  10/01/2022          INTERPRETATION:  CLINICAL INFORMATION: Acute cholecystitis. Evaluate for   common bile duct stone.    COMPARISON: Right upper quadrant ultrasound dated 09/30/2022 and   abdominal CT dated 09/30/2022.    CONTRAST/COMPLICATIONS:  IV Contrast: Gadavist  7 cc administered   .5 cc discarded  Oral Contrast: NONE  Complications: None reported at time of study completion    PROCEDURE:  MRI of the abdomen was performed.  MRCP was performed.    FINDINGS:  LOWER CHEST: Within normal limits.    LIVER: Within normal limits.  BILE DUCTS: Normal caliber. The common bile duct measures up to 5 mm. No   stones identified. There is no intrahepatic biliary dilatation.  GALLBLADDER: Cholelithiasis and mild gallbladder wall edema.  SPLEEN: Within normal limits.  PANCREAS: There is pancreatic divisum. The pancreas is otherwise   unremarkable.  ADRENALS: Within normal limits.  KIDNEYS/URETERS: Within normal limits.    VISUALIZED PORTIONS:  BOWEL: Within normal limits.  PERITONEUM: No ascites.  VESSELS: Within normal limits.  RETROPERITONEUM/LYMPH NODES: No lymphadenopathy.  ABDOMINAL WALL: Within normal limits.  BONES: Within normal limits.    IMPRESSION:  Cholelithiasis and mild gallbladder wall edema..    No evidence of biliary dilatation or choledocholithiasis.    Pancreatic divisum.    MEDICATIONS  (STANDING):  dextrose 5%. 1000 milliLiter(s) (50 mL/Hr) IV Continuous <Continuous>  dextrose 5%. 1000 milliLiter(s) (100 mL/Hr) IV Continuous <Continuous>  dextrose 50% Injectable 12.5 Gram(s) IV Push once  dextrose 50% Injectable 25 Gram(s) IV Push once  dextrose 50% Injectable 25 Gram(s) IV Push once  glucagon  Injectable 1 milliGRAM(s) IntraMuscular once  heparin   Injectable 5000 Unit(s) SubCutaneous every 12 hours  insulin lispro (ADMELOG) corrective regimen sliding scale   SubCutaneous three times a day before meals  insulin lispro (ADMELOG) corrective regimen sliding scale   SubCutaneous at bedtime  losartan 50 milliGRAM(s) Oral daily  ondansetron Injectable 4 milliGRAM(s) IV Push once  piperacillin/tazobactam IVPB.. 3.375 Gram(s) IV Intermittent every 8 hours  sodium chloride 0.9%. 1000 milliLiter(s) (125 mL/Hr) IV Continuous <Continuous>  sodium chloride 0.9%. 1000 milliLiter(s) (100 mL/Hr) IV Continuous <Continuous>    MEDICATIONS  (PRN):  dextrose Oral Gel 15 Gram(s) Oral once PRN Blood Glucose LESS THAN 70 milliGRAM(s)/deciliter  HYDROmorphone  Injectable 1 milliGRAM(s) IV Push every 4 hours PRN Severe Pain (7 - 10)  HYDROmorphone  Injectable 0.5 milliGRAM(s) IV Push every 3 hours PRN Moderate Pain (4 - 6)  HYDROmorphone  Injectable 1 milliGRAM(s) IV Push every 4 hours PRN Severe Pain (7 - 10)  oxycodone    5 mG/acetaminophen 325 mG 1 Tablet(s) Oral every 4 hours PRN Moderate Pain (4 - 6)

## 2022-10-03 NOTE — DISCHARGE NOTE PROVIDER - PROVIDER TOKENS
PROVIDER:[TOKEN:[[id number]],FOLLOWUP:[2 weeks]],FREE:[LAST:[PMD],PHONE:[(   )    -],FAX:[(   )    -],ADDRESS:[F/U LFT],FOLLOWUP:[1 week]]

## 2022-10-03 NOTE — DISCHARGE NOTE PROVIDER - NSDCCPCAREPLAN_GEN_ALL_CORE_FT
PRINCIPAL DISCHARGE DIAGNOSIS  Diagnosis: Abdominal pain  Assessment and Plan of Treatment: acute cholecystitis, s/p cholecystectomy. elevated Liver enzyme. F/U Liver Function test with PMD in 1 week.      SECONDARY DISCHARGE DIAGNOSES  Diagnosis: Cholelithiasis  Assessment and Plan of Treatment:     Diagnosis: Nausea & vomiting  Assessment and Plan of Treatment:

## 2022-10-03 NOTE — DISCHARGE NOTE NURSING/CASE MANAGEMENT/SOCIAL WORK - NSDCPEFALRISK_GEN_ALL_CORE
For information on Fall & Injury Prevention, visit: [email or web address] OR  [email or web address] OR  https://www.cdc.gov/steadi/patient.html

## 2022-10-03 NOTE — CONSULT NOTE ADULT - NS ATTEND AMEND GEN_ALL_CORE FT
67 year old man with cholecysitis s/p cholecystectomy, with elevated LFT's.     Delayed but significant drop along without dilation on MRI or filling defect, likely passed stone vs no stone at all.   Ok to be dsicharged no role for EUS or ERCP.

## 2022-10-03 NOTE — PROGRESS NOTE ADULT - ASSESSMENT
Cholelithiasis, possible choldocholithiasis. Elevation of lft's today. Plan MRCP to r/o CBD stones. If negative, basilio navarro in am.
#Acute gallstone cholecystitis  S/P lap eloisa, POD#1  Surg team following  Pain control with Oxy 5 mg/percocet  IV Zosyn  CCD      #T2DM  - hold oral meds- resume upon d/c  - BGM/SS ac/hs  - check HA1c- 6.1    #HTN- better now   - c/w losartan and monitor bp    #hypomagnesemia- improved  - resolved    #transaminitis   downtrending    #CAD/HTN  - cont statin, asa, arb    #Dispo- likely home today
#Acute gallstone cholecystitis  Surg team following  Imaging studies reviewed  IV Zosyn  OR today  No medical contraindications for OR    #T2DM  - hold oral meds- resume upon d/c  - start to monitor FS and place on SSI-   - check HA1c- 6.1    #HTN- better now   - c/w losartan and monitor bp    #hypomagnesemia- improved  - 2.3 today    #CAD- denies cp, sob  - resume home meds- statin, asa (no bb d/t dorina)  - cardio cs per surgery for clearance    #Dispo- OR today
67M w/PMH CABG 2019, T2DM, visiting his father from [place], presents w/ +acute eloisa  In ED, Mg 1.5, wbc 13, given IV zosyn, dilaudid, US abdomen: Distended gallbladder containing stones and sludge. Wall thickness is normal. If there is persistent clinical concern for acute cholecystitis recommend HIDA scan. CT a/p Multiple suspected jejunal polyps. Is there history of familial polyposis syndrome? Recommend CT confirmation with enterography and GI consultation. Cholelithiasis.    #T2DM  - hold oral meds- resume upon d/c  - start to monitor FS and place on SSI-   - check HA1c- 6.1    #HTN- better now   - c/w losartan and monitor bp    #hypomagnesemia- improved  - 2.3 today    #CAD- denies cp, sob  - resume home meds- statin, asa (no bb d/t dorina)  - cardio cs per surgery for clearance    #cholelithiasis, Transaminitis  - planned for OR possibly- further per surgical team  - IV zosyn   - Pt is MEDICALLY OPTIMIZED FOR SURGERY     #PPX- ambulate
Abdominal pain, biliary colic, possible acute cholecystitis. Admit. IV abx. Med and cardiology eval. Possible lap choley if no improvement.

## 2022-10-14 DIAGNOSIS — K80.20 CALCULUS OF GALLBLADDER WITHOUT CHOLECYSTITIS WITHOUT OBSTRUCTION: ICD-10-CM

## 2022-10-14 DIAGNOSIS — Z79.84 LONG TERM (CURRENT) USE OF ORAL HYPOGLYCEMIC DRUGS: ICD-10-CM

## 2022-10-14 DIAGNOSIS — I10 ESSENTIAL (PRIMARY) HYPERTENSION: ICD-10-CM

## 2022-10-14 DIAGNOSIS — E83.42 HYPOMAGNESEMIA: ICD-10-CM

## 2022-10-14 DIAGNOSIS — K80.62 CALCULUS OF GALLBLADDER AND BILE DUCT WITH ACUTE CHOLECYSTITIS WITHOUT OBSTRUCTION: ICD-10-CM

## 2022-10-14 DIAGNOSIS — I25.10 ATHEROSCLEROTIC HEART DISEASE OF NATIVE CORONARY ARTERY WITHOUT ANGINA PECTORIS: ICD-10-CM

## 2022-10-14 DIAGNOSIS — K82.8 OTHER SPECIFIED DISEASES OF GALLBLADDER: ICD-10-CM

## 2022-10-14 DIAGNOSIS — E78.5 HYPERLIPIDEMIA, UNSPECIFIED: ICD-10-CM

## 2022-10-14 DIAGNOSIS — R74.01 ELEVATION OF LEVELS OF LIVER TRANSAMINASE LEVELS: ICD-10-CM

## 2022-10-14 DIAGNOSIS — K66.0 PERITONEAL ADHESIONS (POSTPROCEDURAL) (POSTINFECTION): ICD-10-CM

## 2022-10-14 DIAGNOSIS — Z20.822 CONTACT WITH AND (SUSPECTED) EXPOSURE TO COVID-19: ICD-10-CM

## 2022-10-14 DIAGNOSIS — Z79.899 OTHER LONG TERM (CURRENT) DRUG THERAPY: ICD-10-CM

## 2022-10-14 DIAGNOSIS — Z95.1 PRESENCE OF AORTOCORONARY BYPASS GRAFT: ICD-10-CM

## 2022-10-14 DIAGNOSIS — Z87.891 PERSONAL HISTORY OF NICOTINE DEPENDENCE: ICD-10-CM

## 2022-10-14 DIAGNOSIS — E11.9 TYPE 2 DIABETES MELLITUS WITHOUT COMPLICATIONS: ICD-10-CM

## 2022-10-14 DIAGNOSIS — Z23 ENCOUNTER FOR IMMUNIZATION: ICD-10-CM

## 2022-10-14 DIAGNOSIS — Z79.82 LONG TERM (CURRENT) USE OF ASPIRIN: ICD-10-CM

## 2022-10-18 PROBLEM — Z00.00 ENCOUNTER FOR PREVENTIVE HEALTH EXAMINATION: Status: ACTIVE | Noted: 2022-10-18

## 2024-05-28 NOTE — ED STATDOCS - ATTENDING CONTRIBUTION TO CARE
Debbie Hendricks  tolerated treatment well with no complications.      Debbie Hendricks is aware of future appt on 6/4 at 1500.     AVS printed and given to Debbie Hendricks:  No (Declined by Debbie Hendricks)        Attending Contribution to Care: I, Oralia Carrillo, performed the initial face to face bedside interview with this patient regarding history of present illness, review of symptoms and relevant past medical, social and family history.  I completed an independent physical examination.  I was the initial provider who evaluated this patient and the history, physical, and MDM reflect this intial assessment. I have signed out the follow up of any pending tests after the original (i.e. labs, radiological studies) to the ACP with instructions to review any with instructions to review any concerning findings to me prior to discharge.  I have communicated the patient’s plan of care and disposition with the ACP.